# Patient Record
Sex: MALE | Race: WHITE | NOT HISPANIC OR LATINO | Employment: FULL TIME | RURAL
[De-identification: names, ages, dates, MRNs, and addresses within clinical notes are randomized per-mention and may not be internally consistent; named-entity substitution may affect disease eponyms.]

---

## 2020-06-02 ENCOUNTER — HISTORICAL (OUTPATIENT)
Dept: ADMINISTRATIVE | Facility: HOSPITAL | Age: 40
End: 2020-06-02

## 2021-07-01 ENCOUNTER — OFFICE VISIT (OUTPATIENT)
Dept: PRIMARY CARE CLINIC | Facility: CLINIC | Age: 41
End: 2021-07-01
Payer: COMMERCIAL

## 2021-07-01 VITALS
BODY MASS INDEX: 35.89 KG/M2 | RESPIRATION RATE: 18 BRPM | OXYGEN SATURATION: 97 % | TEMPERATURE: 99 F | HEART RATE: 87 BPM | SYSTOLIC BLOOD PRESSURE: 118 MMHG | HEIGHT: 72 IN | DIASTOLIC BLOOD PRESSURE: 78 MMHG | WEIGHT: 265 LBS

## 2021-07-01 DIAGNOSIS — J32.9 SINUSITIS, UNSPECIFIED CHRONICITY, UNSPECIFIED LOCATION: Primary | ICD-10-CM

## 2021-07-01 PROCEDURE — 96372 THER/PROPH/DIAG INJ SC/IM: CPT | Mod: ,,, | Performed by: FAMILY MEDICINE

## 2021-07-01 PROCEDURE — 96372 PR INJECTION,THERAP/PROPH/DIAG2ST, IM OR SUBCUT: ICD-10-PCS | Mod: ,,, | Performed by: FAMILY MEDICINE

## 2021-07-01 PROCEDURE — 99213 PR OFFICE/OUTPT VISIT, EST, LEVL III, 20-29 MIN: ICD-10-PCS | Mod: 25,,, | Performed by: FAMILY MEDICINE

## 2021-07-01 PROCEDURE — 99213 OFFICE O/P EST LOW 20 MIN: CPT | Mod: 25,,, | Performed by: FAMILY MEDICINE

## 2021-07-01 RX ORDER — TADALAFIL 5 MG/1
TABLET ORAL
COMMUNITY
Start: 2021-04-15

## 2021-07-01 RX ORDER — CEFTRIAXONE 1 G/1
1 INJECTION, POWDER, FOR SOLUTION INTRAMUSCULAR; INTRAVENOUS
Status: COMPLETED | OUTPATIENT
Start: 2021-07-01 | End: 2021-07-01

## 2021-07-01 RX ORDER — BETAMETHASONE SODIUM PHOSPHATE AND BETAMETHASONE ACETATE 3; 3 MG/ML; MG/ML
6 INJECTION, SUSPENSION INTRA-ARTICULAR; INTRALESIONAL; INTRAMUSCULAR; SOFT TISSUE
Status: COMPLETED | OUTPATIENT
Start: 2021-07-01 | End: 2021-07-01

## 2021-07-01 RX ADMIN — BETAMETHASONE SODIUM PHOSPHATE AND BETAMETHASONE ACETATE 6 MG: 3; 3 INJECTION, SUSPENSION INTRA-ARTICULAR; INTRALESIONAL; INTRAMUSCULAR; SOFT TISSUE at 03:07

## 2021-07-01 RX ADMIN — CEFTRIAXONE 1 G: 1 INJECTION, POWDER, FOR SOLUTION INTRAMUSCULAR; INTRAVENOUS at 03:07

## 2021-10-05 ENCOUNTER — OFFICE VISIT (OUTPATIENT)
Dept: PRIMARY CARE CLINIC | Facility: CLINIC | Age: 41
End: 2021-10-05
Payer: COMMERCIAL

## 2021-10-05 VITALS
WEIGHT: 255 LBS | HEIGHT: 72 IN | DIASTOLIC BLOOD PRESSURE: 72 MMHG | TEMPERATURE: 98 F | HEART RATE: 88 BPM | BODY MASS INDEX: 34.54 KG/M2 | SYSTOLIC BLOOD PRESSURE: 124 MMHG | RESPIRATION RATE: 18 BRPM | OXYGEN SATURATION: 97 %

## 2021-10-05 DIAGNOSIS — R05.9 COUGH: ICD-10-CM

## 2021-10-05 DIAGNOSIS — H92.02 LEFT EAR PAIN: Primary | ICD-10-CM

## 2021-10-05 DIAGNOSIS — R51.9 NONINTRACTABLE HEADACHE, UNSPECIFIED CHRONICITY PATTERN, UNSPECIFIED HEADACHE TYPE: ICD-10-CM

## 2021-10-05 DIAGNOSIS — R52 BODY ACHES: ICD-10-CM

## 2021-10-05 DIAGNOSIS — R09.89 RUNNY NOSE: ICD-10-CM

## 2021-10-05 DIAGNOSIS — J02.9 SORE THROAT: ICD-10-CM

## 2021-10-05 DIAGNOSIS — J01.10 ACUTE FRONTAL SINUSITIS, RECURRENCE NOT SPECIFIED: ICD-10-CM

## 2021-10-05 DIAGNOSIS — R09.81 NASAL CONGESTION: ICD-10-CM

## 2021-10-05 DIAGNOSIS — R19.7 DIARRHEA, UNSPECIFIED TYPE: ICD-10-CM

## 2021-10-05 PROCEDURE — 99213 OFFICE O/P EST LOW 20 MIN: CPT | Mod: 25,,, | Performed by: NURSE PRACTITIONER

## 2021-10-05 PROCEDURE — 96372 THER/PROPH/DIAG INJ SC/IM: CPT | Mod: ,,, | Performed by: NURSE PRACTITIONER

## 2021-10-05 PROCEDURE — 99213 PR OFFICE/OUTPT VISIT, EST, LEVL III, 20-29 MIN: ICD-10-PCS | Mod: 25,,, | Performed by: NURSE PRACTITIONER

## 2021-10-05 PROCEDURE — 96372 PR INJECTION,THERAP/PROPH/DIAG2ST, IM OR SUBCUT: ICD-10-PCS | Mod: ,,, | Performed by: NURSE PRACTITIONER

## 2021-10-05 RX ORDER — BETAMETHASONE SODIUM PHOSPHATE AND BETAMETHASONE ACETATE 3; 3 MG/ML; MG/ML
6 INJECTION, SUSPENSION INTRA-ARTICULAR; INTRALESIONAL; INTRAMUSCULAR; SOFT TISSUE
Status: COMPLETED | OUTPATIENT
Start: 2021-10-05 | End: 2021-10-05

## 2021-10-05 RX ORDER — AMOXICILLIN AND CLAVULANATE POTASSIUM 500; 125 MG/1; MG/1
1 TABLET, FILM COATED ORAL EVERY 12 HOURS
Qty: 20 TABLET | Refills: 0 | Status: SHIPPED | OUTPATIENT
Start: 2021-10-05 | End: 2021-10-05 | Stop reason: CLARIF

## 2021-10-05 RX ORDER — CEFTRIAXONE 1 G/1
1 INJECTION, POWDER, FOR SOLUTION INTRAMUSCULAR; INTRAVENOUS
Status: COMPLETED | OUTPATIENT
Start: 2021-10-05 | End: 2021-10-05

## 2021-10-05 RX ADMIN — BETAMETHASONE SODIUM PHOSPHATE AND BETAMETHASONE ACETATE 6 MG: 3; 3 INJECTION, SUSPENSION INTRA-ARTICULAR; INTRALESIONAL; INTRAMUSCULAR; SOFT TISSUE at 05:10

## 2021-10-05 RX ADMIN — CEFTRIAXONE 1 G: 1 INJECTION, POWDER, FOR SOLUTION INTRAMUSCULAR; INTRAVENOUS at 05:10

## 2021-10-18 ENCOUNTER — OFFICE VISIT (OUTPATIENT)
Dept: PRIMARY CARE CLINIC | Facility: CLINIC | Age: 41
End: 2021-10-18
Payer: COMMERCIAL

## 2021-10-18 VITALS
HEART RATE: 85 BPM | HEIGHT: 72 IN | TEMPERATURE: 98 F | DIASTOLIC BLOOD PRESSURE: 84 MMHG | RESPIRATION RATE: 20 BRPM | OXYGEN SATURATION: 97 % | WEIGHT: 269 LBS | SYSTOLIC BLOOD PRESSURE: 122 MMHG | BODY MASS INDEX: 36.44 KG/M2

## 2021-10-18 DIAGNOSIS — J32.9 SINUSITIS, UNSPECIFIED CHRONICITY, UNSPECIFIED LOCATION: Primary | ICD-10-CM

## 2021-10-18 DIAGNOSIS — R53.83 FATIGUE, UNSPECIFIED TYPE: ICD-10-CM

## 2021-10-18 PROCEDURE — 99213 OFFICE O/P EST LOW 20 MIN: CPT | Mod: 25,,, | Performed by: FAMILY MEDICINE

## 2021-10-18 PROCEDURE — 96372 THER/PROPH/DIAG INJ SC/IM: CPT | Mod: ,,, | Performed by: FAMILY MEDICINE

## 2021-10-18 PROCEDURE — 99213 PR OFFICE/OUTPT VISIT, EST, LEVL III, 20-29 MIN: ICD-10-PCS | Mod: 25,,, | Performed by: FAMILY MEDICINE

## 2021-10-18 PROCEDURE — 96372 PR INJECTION,THERAP/PROPH/DIAG2ST, IM OR SUBCUT: ICD-10-PCS | Mod: ,,, | Performed by: FAMILY MEDICINE

## 2021-10-18 RX ORDER — AMOXICILLIN 500 MG/1
CAPSULE ORAL
COMMUNITY
Start: 2021-10-06 | End: 2022-10-13 | Stop reason: ALTCHOICE

## 2021-10-18 RX ORDER — PHENTERMINE HYDROCHLORIDE 37.5 MG/1
37.5 TABLET ORAL
Qty: 30 TABLET | Refills: 0 | Status: SHIPPED | OUTPATIENT
Start: 2021-10-18 | End: 2021-11-17

## 2021-10-18 RX ORDER — CEFTRIAXONE 1 G/1
1 INJECTION, POWDER, FOR SOLUTION INTRAMUSCULAR; INTRAVENOUS
Status: COMPLETED | OUTPATIENT
Start: 2021-10-18 | End: 2021-10-18

## 2021-10-18 RX ORDER — NEOMYCIN SULFATE, POLYMYXIN B SULFATE AND HYDROCORTISONE 10; 3.5; 1 MG/ML; MG/ML; [USP'U]/ML
SUSPENSION/ DROPS AURICULAR (OTIC)
COMMUNITY
Start: 2021-10-06 | End: 2022-10-13 | Stop reason: ALTCHOICE

## 2021-10-18 RX ORDER — BETAMETHASONE SODIUM PHOSPHATE AND BETAMETHASONE ACETATE 3; 3 MG/ML; MG/ML
6 INJECTION, SUSPENSION INTRA-ARTICULAR; INTRALESIONAL; INTRAMUSCULAR; SOFT TISSUE
Status: COMPLETED | OUTPATIENT
Start: 2021-10-18 | End: 2021-10-18

## 2021-10-18 RX ADMIN — CEFTRIAXONE 1 G: 1 INJECTION, POWDER, FOR SOLUTION INTRAMUSCULAR; INTRAVENOUS at 03:10

## 2021-10-18 RX ADMIN — BETAMETHASONE SODIUM PHOSPHATE AND BETAMETHASONE ACETATE 6 MG: 3; 3 INJECTION, SUSPENSION INTRA-ARTICULAR; INTRALESIONAL; INTRAMUSCULAR; SOFT TISSUE at 03:10

## 2021-12-08 ENCOUNTER — OFFICE VISIT (OUTPATIENT)
Dept: PRIMARY CARE CLINIC | Facility: CLINIC | Age: 41
End: 2021-12-08
Payer: COMMERCIAL

## 2021-12-08 VITALS
HEIGHT: 72 IN | DIASTOLIC BLOOD PRESSURE: 84 MMHG | TEMPERATURE: 98 F | RESPIRATION RATE: 20 BRPM | HEART RATE: 88 BPM | SYSTOLIC BLOOD PRESSURE: 122 MMHG | BODY MASS INDEX: 35.76 KG/M2 | WEIGHT: 264 LBS | OXYGEN SATURATION: 97 %

## 2021-12-08 DIAGNOSIS — E66.9 OBESITY, UNSPECIFIED CLASSIFICATION, UNSPECIFIED OBESITY TYPE, UNSPECIFIED WHETHER SERIOUS COMORBIDITY PRESENT: ICD-10-CM

## 2021-12-08 DIAGNOSIS — R05.9 COUGH: Primary | ICD-10-CM

## 2021-12-08 DIAGNOSIS — J45.40 MODERATE PERSISTENT ASTHMATIC BRONCHITIS WITHOUT COMPLICATION: ICD-10-CM

## 2021-12-08 PROCEDURE — 99213 PR OFFICE/OUTPT VISIT, EST, LEVL III, 20-29 MIN: ICD-10-PCS | Mod: 25,,, | Performed by: FAMILY MEDICINE

## 2021-12-08 PROCEDURE — 96372 THER/PROPH/DIAG INJ SC/IM: CPT | Mod: ,,, | Performed by: FAMILY MEDICINE

## 2021-12-08 PROCEDURE — 96372 PR INJECTION,THERAP/PROPH/DIAG2ST, IM OR SUBCUT: ICD-10-PCS | Mod: ,,, | Performed by: FAMILY MEDICINE

## 2021-12-08 PROCEDURE — 99213 OFFICE O/P EST LOW 20 MIN: CPT | Mod: 25,,, | Performed by: FAMILY MEDICINE

## 2021-12-08 RX ORDER — ALBUTEROL SULFATE 90 UG/1
2 AEROSOL, METERED RESPIRATORY (INHALATION) EVERY 6 HOURS PRN
Qty: 18 G | Refills: 0 | Status: SHIPPED | OUTPATIENT
Start: 2021-12-08 | End: 2022-10-13 | Stop reason: ALTCHOICE

## 2021-12-08 RX ORDER — BETAMETHASONE SODIUM PHOSPHATE AND BETAMETHASONE ACETATE 3; 3 MG/ML; MG/ML
6 INJECTION, SUSPENSION INTRA-ARTICULAR; INTRALESIONAL; INTRAMUSCULAR; SOFT TISSUE
Status: COMPLETED | OUTPATIENT
Start: 2021-12-08 | End: 2021-12-08

## 2021-12-08 RX ORDER — DEXCHLORPHENIRAMINE MALEATE, DEXTROMETHORPHAN HBR, PHENYLEPHRINE HCL 1; 10; 5 MG/5ML; MG/5ML; MG/5ML
10 SYRUP ORAL
Qty: 240 ML | Refills: 1 | Status: SHIPPED | OUTPATIENT
Start: 2021-12-08 | End: 2022-10-13 | Stop reason: ALTCHOICE

## 2021-12-08 RX ORDER — CEFTRIAXONE 1 G/1
1 INJECTION, POWDER, FOR SOLUTION INTRAMUSCULAR; INTRAVENOUS
Status: COMPLETED | OUTPATIENT
Start: 2021-12-08 | End: 2021-12-08

## 2021-12-08 RX ORDER — PHENTERMINE HYDROCHLORIDE 37.5 MG/1
37.5 TABLET ORAL
COMMUNITY
End: 2021-12-08 | Stop reason: SDUPTHER

## 2021-12-08 RX ORDER — PHENTERMINE HYDROCHLORIDE 37.5 MG/1
37.5 TABLET ORAL
Qty: 30 TABLET | Refills: 0 | Status: SHIPPED | OUTPATIENT
Start: 2021-12-08 | End: 2022-04-21 | Stop reason: SDUPTHER

## 2021-12-08 RX ORDER — AMOXICILLIN AND CLAVULANATE POTASSIUM 500; 125 MG/1; MG/1
1 TABLET, FILM COATED ORAL 2 TIMES DAILY
Qty: 20 TABLET | Refills: 0 | Status: SHIPPED | OUTPATIENT
Start: 2021-12-08 | End: 2022-10-13 | Stop reason: ALTCHOICE

## 2021-12-08 RX ADMIN — BETAMETHASONE SODIUM PHOSPHATE AND BETAMETHASONE ACETATE 6 MG: 3; 3 INJECTION, SUSPENSION INTRA-ARTICULAR; INTRALESIONAL; INTRAMUSCULAR; SOFT TISSUE at 04:12

## 2021-12-08 RX ADMIN — CEFTRIAXONE 1 G: 1 INJECTION, POWDER, FOR SOLUTION INTRAMUSCULAR; INTRAVENOUS at 04:12

## 2022-02-06 ENCOUNTER — HOSPITAL ENCOUNTER (EMERGENCY)
Facility: HOSPITAL | Age: 42
Discharge: HOME | End: 2022-02-06
Attending: FAMILY MEDICINE
Payer: COMMERCIAL

## 2022-02-06 VITALS
TEMPERATURE: 98 F | SYSTOLIC BLOOD PRESSURE: 124 MMHG | WEIGHT: 255 LBS | OXYGEN SATURATION: 98 % | HEART RATE: 100 BPM | DIASTOLIC BLOOD PRESSURE: 64 MMHG | BODY MASS INDEX: 34.54 KG/M2 | HEIGHT: 72 IN | RESPIRATION RATE: 21 BRPM

## 2022-02-06 DIAGNOSIS — G89.11 ACUTE PAIN OF RIGHT SHOULDER DUE TO TRAUMA: ICD-10-CM

## 2022-02-06 DIAGNOSIS — M25.511 ACUTE PAIN OF RIGHT SHOULDER DUE TO TRAUMA: ICD-10-CM
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
## 2022-02-06 PROCEDURE — 99283 EMERGENCY DEPT VISIT LOW MDM: CPT | Mod: ,,, | Performed by: FAMILY MEDICINE

## 2022-02-06 PROCEDURE — 99283 PR EMERGENCY DEPT VISIT,LEVEL III: ICD-10-PCS | Mod: ,,, | Performed by: FAMILY MEDICINE

## 2022-02-06 PROCEDURE — 99284 EMERGENCY DEPT VISIT MOD MDM: CPT | Mod: 25

## 2022-02-06 PROCEDURE — 96372 THER/PROPH/DIAG INJ SC/IM: CPT

## 2022-02-06 PROCEDURE — 63600175 PHARM REV CODE 636 W HCPCS: Performed by: FAMILY MEDICINE

## 2022-02-06 RX ORDER — METHOCARBAMOL 750 MG/1
TABLET, FILM COATED ORAL
Qty: 30 TABLET | Refills: 0 | Status: SHIPPED | OUTPATIENT
Start: 2022-02-06 | End: 2022-10-13 | Stop reason: ALTCHOICE

## 2022-02-06 RX ORDER — NABUMETONE 750 MG/1
750 TABLET, FILM COATED ORAL 2 TIMES DAILY
Qty: 20 TABLET | Refills: 0 | Status: SHIPPED | OUTPATIENT
Start: 2022-02-06 | End: 2022-10-13 | Stop reason: ALTCHOICE

## 2022-02-06 RX ORDER — ORPHENADRINE CITRATE 30 MG/ML
60 INJECTION INTRAMUSCULAR; INTRAVENOUS
Status: COMPLETED | OUTPATIENT
Start: 2022-02-06 | End: 2022-02-06

## 2022-02-06 RX ORDER — KETOROLAC TROMETHAMINE 30 MG/ML
60 INJECTION, SOLUTION INTRAMUSCULAR; INTRAVENOUS
Status: COMPLETED | OUTPATIENT
Start: 2022-02-06 | End: 2022-02-06

## 2022-02-06 RX ADMIN — KETOROLAC TROMETHAMINE 60 MG: 30 INJECTION, SOLUTION INTRAMUSCULAR at 08:02

## 2022-02-06 RX ADMIN — ORPHENADRINE CITRATE 60 MG: 30 INJECTION INTRAMUSCULAR; INTRAVENOUS at 08:02

## 2022-02-07 ENCOUNTER — TELEPHONE (OUTPATIENT)
Dept: EMERGENCY MEDICINE | Facility: HOSPITAL | Age: 42
End: 2022-02-07
Payer: COMMERCIAL

## 2022-02-07 NOTE — ED TRIAGE NOTES
PRESENTS WITH C/O PAIN TO RT BACK UPPER RIB PAIN AND LOWER RT SHOULDER PAIN. STATES HE WAS THROWN FROM A HORSE APPRO 1-11/2 HOUR AGO. PAIN WORSE WITH MOVEMENT AND PAIN WITH INSPIRATION.

## 2022-02-07 NOTE — ED PROVIDER NOTES
Encounter Date: 2/6/2022       History     Chief Complaint   Patient presents with    Rib Injury     RT UPPER BACK RIB PAIN    Shoulder Pain     RT SHOULDER     Pt fell from horse about 1.5 hrs ago.  C/o pain between right shoulder and back, hurting worse with certain shoulder and torso movements and worse with deep inspirations.  No head strike, no LOC.  No N/V/Dizziness.  No other injury or c/o.          Review of patient's allergies indicates:   Allergen Reactions    Bactrim [sulfamethoxazole-trimethoprim]      History reviewed. No pertinent past medical history.  History reviewed. No pertinent surgical history.  History reviewed. No pertinent family history.  Social History     Tobacco Use    Smoking status: Never Smoker    Smokeless tobacco: Never Used   Substance Use Topics    Alcohol use: Yes     Comment: socially    Drug use: Never     Review of Systems   Musculoskeletal: Positive for back pain (muscles between right shoulder and spine.).   All other systems reviewed and are negative.      Physical Exam     Initial Vitals [02/06/22 1927]   BP Pulse Resp Temp SpO2   138/85 106 (!) 21 98.3 °F (36.8 °C) 98 %      MAP       --         Physical Exam    Nursing note and vitals reviewed.  Constitutional: He appears well-developed and well-nourished.   HENT:   Head: Normocephalic and atraumatic.   Neck: Neck supple.   Normal range of motion.  Cardiovascular: Normal rate, regular rhythm and normal heart sounds.   Pulmonary/Chest: Breath sounds normal. No respiratory distress. He has no wheezes. He has no rhonchi. He has no rales.   Musculoskeletal:         General: No tenderness or edema. Normal range of motion.      Cervical back: Normal range of motion and neck supple.     Neurological: He is alert and oriented to person, place, and time. He has normal strength. He displays normal reflexes. No cranial nerve deficit or sensory deficit.   Skin: Skin is warm and dry. Capillary refill takes less than 2 seconds.    Psychiatric: He has a normal mood and affect.         Medical Screening Exam   See Full Note    ED Course   Procedures  Labs Reviewed - No data to display       Imaging Results          X-Ray Shoulder Trauma Right (In process)                X-Ray Chest PA And Lateral (In process)                  Medications   ketorolac injection 60 mg (60 mg Intramuscular Given 2/6/22 2003)   orphenadrine injection 60 mg (60 mg Intramuscular Given 2/6/22 2003)                       Clinical Impression:   Final diagnoses:  [W18.39XA, Y93.79] Fall during sporting event, initial encounter  [M25.511, G89.11] Acute pain of right shoulder due to trauma  [V80.010A] Fall from horse, initial encounter (Primary)          ED Disposition Condition    Discharge Stable        ED Prescriptions     Medication Sig Dispense Start Date End Date Auth. Provider    nabumetone (RELAFEN) 750 MG tablet Take 1 tablet (750 mg total) by mouth 2 (two) times daily. 20 tablet 2/6/2022  Lars Ramos MD    methocarbamoL (ROBAXIN) 750 MG Tab Take 1-2 tabs po q6h prn pain/spasm.  Drowsy precautions. 30 tablet 2/6/2022  Lars Ramos MD        Follow-up Information     Follow up With Specialties Details Why Contact Info    Herminia Murphy MD Family Medicine   [address] [facility] [place] [address]  [phone]             Lars Ramos MD  02/06/22 2004

## 2022-04-21 ENCOUNTER — CLINICAL SUPPORT (OUTPATIENT)
Dept: PRIMARY CARE CLINIC | Facility: CLINIC | Age: 42
End: 2022-04-21
Payer: COMMERCIAL

## 2022-04-21 VITALS
BODY MASS INDEX: 36.57 KG/M2 | RESPIRATION RATE: 20 BRPM | WEIGHT: 270 LBS | DIASTOLIC BLOOD PRESSURE: 72 MMHG | SYSTOLIC BLOOD PRESSURE: 114 MMHG | HEIGHT: 72 IN

## 2022-04-21 DIAGNOSIS — E66.9 OBESITY, UNSPECIFIED CLASSIFICATION, UNSPECIFIED OBESITY TYPE, UNSPECIFIED WHETHER SERIOUS COMORBIDITY PRESENT: ICD-10-CM

## 2022-04-21 RX ORDER — PHENTERMINE HYDROCHLORIDE 37.5 MG/1
37.5 TABLET ORAL
Qty: 30 TABLET | Refills: 0 | Status: SHIPPED | OUTPATIENT
Start: 2022-04-21 | End: 2022-10-03 | Stop reason: SDUPTHER

## 2022-10-03 DIAGNOSIS — E66.9 OBESITY, UNSPECIFIED CLASSIFICATION, UNSPECIFIED OBESITY TYPE, UNSPECIFIED WHETHER SERIOUS COMORBIDITY PRESENT: ICD-10-CM

## 2022-10-04 RX ORDER — PHENTERMINE HYDROCHLORIDE 37.5 MG/1
37.5 TABLET ORAL
Qty: 30 TABLET | Refills: 0 | Status: SHIPPED | OUTPATIENT
Start: 2022-10-04

## 2022-10-06 DIAGNOSIS — M67.912 TENDINOPATHY OF LEFT SHOULDER: Primary | ICD-10-CM

## 2022-10-13 ENCOUNTER — OFFICE VISIT (OUTPATIENT)
Dept: PRIMARY CARE CLINIC | Facility: CLINIC | Age: 42
End: 2022-10-13
Payer: COMMERCIAL

## 2022-10-13 VITALS
OXYGEN SATURATION: 97 % | HEIGHT: 72 IN | SYSTOLIC BLOOD PRESSURE: 120 MMHG | DIASTOLIC BLOOD PRESSURE: 72 MMHG | HEART RATE: 94 BPM | RESPIRATION RATE: 20 BRPM | BODY MASS INDEX: 36.84 KG/M2 | TEMPERATURE: 99 F | WEIGHT: 272 LBS

## 2022-10-13 DIAGNOSIS — J32.9 SINUSITIS, UNSPECIFIED CHRONICITY, UNSPECIFIED LOCATION: Primary | ICD-10-CM

## 2022-10-13 PROCEDURE — 3074F SYST BP LT 130 MM HG: CPT | Mod: CPTII,,, | Performed by: FAMILY MEDICINE

## 2022-10-13 PROCEDURE — 96372 THER/PROPH/DIAG INJ SC/IM: CPT | Mod: ,,, | Performed by: FAMILY MEDICINE

## 2022-10-13 PROCEDURE — 1159F PR MEDICATION LIST DOCUMENTED IN MEDICAL RECORD: ICD-10-PCS | Mod: CPTII,,, | Performed by: FAMILY MEDICINE

## 2022-10-13 PROCEDURE — 1160F PR REVIEW ALL MEDS BY PRESCRIBER/CLIN PHARMACIST DOCUMENTED: ICD-10-PCS | Mod: CPTII,,, | Performed by: FAMILY MEDICINE

## 2022-10-13 PROCEDURE — 3078F DIAST BP <80 MM HG: CPT | Mod: CPTII,,, | Performed by: FAMILY MEDICINE

## 2022-10-13 PROCEDURE — 1159F MED LIST DOCD IN RCRD: CPT | Mod: CPTII,,, | Performed by: FAMILY MEDICINE

## 2022-10-13 PROCEDURE — 3074F PR MOST RECENT SYSTOLIC BLOOD PRESSURE < 130 MM HG: ICD-10-PCS | Mod: CPTII,,, | Performed by: FAMILY MEDICINE

## 2022-10-13 PROCEDURE — 3078F PR MOST RECENT DIASTOLIC BLOOD PRESSURE < 80 MM HG: ICD-10-PCS | Mod: CPTII,,, | Performed by: FAMILY MEDICINE

## 2022-10-13 PROCEDURE — 99213 PR OFFICE/OUTPT VISIT, EST, LEVL III, 20-29 MIN: ICD-10-PCS | Mod: 25,,, | Performed by: FAMILY MEDICINE

## 2022-10-13 PROCEDURE — 96372 PR INJECTION,THERAP/PROPH/DIAG2ST, IM OR SUBCUT: ICD-10-PCS | Mod: ,,, | Performed by: FAMILY MEDICINE

## 2022-10-13 PROCEDURE — 99213 OFFICE O/P EST LOW 20 MIN: CPT | Mod: 25,,, | Performed by: FAMILY MEDICINE

## 2022-10-13 PROCEDURE — 1160F RVW MEDS BY RX/DR IN RCRD: CPT | Mod: CPTII,,, | Performed by: FAMILY MEDICINE

## 2022-10-13 RX ORDER — CEFTRIAXONE 1 G/1
1 INJECTION, POWDER, FOR SOLUTION INTRAMUSCULAR; INTRAVENOUS
Status: COMPLETED | OUTPATIENT
Start: 2022-10-13 | End: 2022-10-13

## 2022-10-13 RX ORDER — METHYLPREDNISOLONE 4 MG/1
TABLET ORAL
Qty: 21 EACH | Refills: 0 | Status: SHIPPED | OUTPATIENT
Start: 2022-10-13 | End: 2022-10-24 | Stop reason: SDUPTHER

## 2022-10-13 RX ORDER — DEXCHLORPHENIRAMINE MALEATE, DEXTROMETHORPHAN HBR, PHENYLEPHRINE HCL 1; 10; 5 MG/5ML; MG/5ML; MG/5ML
10 SYRUP ORAL
Qty: 240 ML | Refills: 1 | Status: SHIPPED | OUTPATIENT
Start: 2022-10-13 | End: 2022-12-21 | Stop reason: ALTCHOICE

## 2022-10-13 RX ORDER — CEFDINIR 300 MG/1
300 CAPSULE ORAL 2 TIMES DAILY
Qty: 20 CAPSULE | Refills: 0 | Status: SHIPPED | OUTPATIENT
Start: 2022-10-13 | End: 2022-10-23

## 2022-10-13 RX ORDER — BETAMETHASONE SODIUM PHOSPHATE AND BETAMETHASONE ACETATE 3; 3 MG/ML; MG/ML
6 INJECTION, SUSPENSION INTRA-ARTICULAR; INTRALESIONAL; INTRAMUSCULAR; SOFT TISSUE
Status: COMPLETED | OUTPATIENT
Start: 2022-10-13 | End: 2022-10-13

## 2022-10-13 RX ADMIN — BETAMETHASONE SODIUM PHOSPHATE AND BETAMETHASONE ACETATE 6 MG: 3; 3 INJECTION, SUSPENSION INTRA-ARTICULAR; INTRALESIONAL; INTRAMUSCULAR; SOFT TISSUE at 02:10

## 2022-10-13 RX ADMIN — CEFTRIAXONE 1 G: 1 INJECTION, POWDER, FOR SOLUTION INTRAMUSCULAR; INTRAVENOUS at 02:10

## 2022-10-13 NOTE — PROGRESS NOTES
Subjective:       Patient ID: Regino Will is a 42 y.o. male.    Chief Complaint: Nasal Congestion, Cough, Sinus Problem, and Otalgia    Cut hay. Now congested    Cough  Associated symptoms include ear pain. Pertinent negatives include no chest pain, fever, headaches, myalgias or shortness of breath. There is no history of environmental allergies.   Sinus Problem  Associated symptoms include congestion, coughing, ear pain and sinus pressure. Pertinent negatives include no headaches or shortness of breath.   Otalgia   Associated symptoms include coughing. Pertinent negatives include no diarrhea, headaches or vomiting.   Review of Systems   Constitutional:  Negative for fatigue and fever.   HENT:  Positive for nasal congestion, ear pain and sinus pressure/congestion. Negative for dental problem.    Eyes:  Negative for discharge.   Respiratory:  Positive for cough. Negative for choking, chest tightness and shortness of breath.    Cardiovascular:  Negative for chest pain and leg swelling.   Gastrointestinal:  Negative for constipation, diarrhea, nausea and vomiting.   Genitourinary:  Negative for discharge and flank pain.   Musculoskeletal:  Negative for arthralgias and myalgias.   Allergic/Immunologic: Negative for environmental allergies.   Neurological:  Negative for headaches and memory loss.   Psychiatric/Behavioral:  Negative for behavioral problems and hallucinations.        Objective:      Physical Exam  Vitals and nursing note reviewed.   Constitutional:       Appearance: Normal appearance. He is normal weight.   HENT:      Head: Normocephalic and atraumatic.      Right Ear: Tympanic membrane normal.      Left Ear: Tympanic membrane normal.      Ears:      Comments: Both TM's are gray but dull     Nose: Congestion present.      Mouth/Throat:      Mouth: Mucous membranes are moist.   Eyes:      Extraocular Movements: Extraocular movements intact.      Conjunctiva/sclera: Conjunctivae normal.      Pupils:  Pupils are equal, round, and reactive to light.   Cardiovascular:      Rate and Rhythm: Normal rate and regular rhythm.      Pulses: Normal pulses.   Pulmonary:      Effort: Pulmonary effort is normal.      Breath sounds: Normal breath sounds.   Abdominal:      General: Abdomen is flat. Bowel sounds are normal.      Palpations: Abdomen is soft.   Musculoskeletal:         General: Normal range of motion.      Cervical back: Normal range of motion and neck supple.   Skin:     General: Skin is warm and dry.   Neurological:      General: No focal deficit present.      Mental Status: He is alert and oriented to person, place, and time.   Psychiatric:         Mood and Affect: Mood normal.       Assessment:       Problem List Items Addressed This Visit          ENT    Sinusitis - Primary    Relevant Medications    cefTRIAXone injection 1 g (Start on 10/13/2022  2:15 PM)    betamethasone acetate-betamethasone sodium phosphate injection 6 mg (Start on 10/13/2022  2:15 PM)    cefdinir (OMNICEF) 300 MG capsule    methylPREDNISolone (MEDROL DOSEPACK) 4 mg tablet    dexchlorphen-phenylephrine-DM (POLYTUSSIN DM) 1-5-10 mg/5 mL Syrp       Plan:       RTC if s/sx continue

## 2022-10-17 ENCOUNTER — CLINICAL SUPPORT (OUTPATIENT)
Dept: REHABILITATION | Facility: HOSPITAL | Age: 42
End: 2022-10-17
Payer: COMMERCIAL

## 2022-10-17 DIAGNOSIS — R52 PAIN: ICD-10-CM

## 2022-10-17 DIAGNOSIS — M67.912 TENDINOPATHY OF LEFT SHOULDER: ICD-10-CM

## 2022-10-17 PROCEDURE — 97140 MANUAL THERAPY 1/> REGIONS: CPT

## 2022-10-17 PROCEDURE — 97161 PT EVAL LOW COMPLEX 20 MIN: CPT

## 2022-10-17 PROCEDURE — 97110 THERAPEUTIC EXERCISES: CPT

## 2022-10-17 PROCEDURE — 97035 APP MDLTY 1+ULTRASOUND EA 15: CPT

## 2022-10-17 NOTE — PLAN OF CARE
"[facility]   Physical Therapy Initial Evaluation    Name: Regino Will  Clinic Number: [id number]    Therapy Diagnosis:   Encounter Diagnosis   Name Primary?    Tendinopathy of left shoulder      Physician: Alex Roberts MD    Physician Orders: PT Eval and Treat   Medical Diagnosis from Referral: L shoulder tendinopathy  Evaluation Date: 10/17/2022  Authorization Period Expiration: 10/6/2023  Plan of Care Expiration: 11/14/2022  Visit # / Visits authorized: 1/ 8    Time In: 14:32  Time Out: 15:30  Total Appointment Time (timed & untimed codes): 58 minutes    Precautions: Standard    Subjective   Date of onset: years ago  History of current condition - Regino reports: Pt with complaints of L shoulder pain that started years ago. Pt is R handed and is  with overhead work. Pt saw ortho MD that stated irritability of tendon. Pt denies neurological symptoms and neck pathologies. Pt with increased pain with lateral shoulder abduction and ER. 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 Pt referred for conservative care.      Medical History:   No past medical history on file.    Surgical History:   Regino Will  has no past surgical history on file.    Medications:   Regino has a current medication list which includes the following prescription(s): cefdinir, polytussin dm, methylprednisolone, phentermine, and tadalafil.    Allergies:   Review of patient's allergies indicates:   Allergen Reactions    Bactrim [sulfamethoxazole-trimethoprim]         Imaging, xray: no pathologies noted    Prior Therapy: none  Social History:  lives with their family  Occupation:   Prior Level of Function: active  Current Level of Function: active    Pain:  Current 2/10, worst 5/10, best 0/10   Location: left shoulder  Description: Aching and Sharp  Aggravating Factors: yawning, reaching behind head  Easing Factors: rest    Pts goals: "to be able to reach back"     Objective     Posture: rounded " shoulders yes, forward head yes, increased kyphotic curve yes, decreased lordotic curve no  Clear cervical spine: Spurling's test negative    Range of motion  Motion Right  Left    Shoulder flexion WITHIN FUNCTIONAL LIMITS WITHIN FUNCTIONAL LIMITS   Shoulder extension WITHIN FUNCTIONAL LIMITS WITHIN FUNCTIONAL LIMITS   Shoulder abduction WITHIN FUNCTIONAL LIMITS WITHIN FUNCTIONAL LIMITS   Shoulder internal rotation WITHIN FUNCTIONAL LIMITS WITHIN FUNCTIONAL LIMITS   Shoulder external rotation WITHIN FUNCTIONAL LIMITS WITHIN FUNCTIONAL LIMITS   Elbow flexion WITHIN FUNCTIONAL LIMITS WITHIN FUNCTIONAL LIMITS   Elbow extension WITHIN FUNCTIONAL LIMITS WITHIN FUNCTIONAL LIMITS   Wrist flexion WITHIN FUNCTIONAL LIMITS WITHIN FUNCTIONAL LIMITS   Wrist extension WITHIN FUNCTIONAL LIMITS WITHIN FUNCTIONAL LIMITS   Ulnar deviation WITHIN FUNCTIONAL LIMITS WITHIN FUNCTIONAL LIMITS   Radial deviation WITHIN FUNCTIONAL LIMITS WITHIN FUNCTIONAL LIMITS     Passive range of motion: WFL's    MANUAL MUSCLE TEST  Muscle Right  Left    Shoulder flexion MMT strength: 5/5 MMT strength: 5/5   Shoulder extension MMT strength: 5/5 MMT strength: 5/5   Shoulder abduction MMT strength: 5/5 MMT strength: 5/5   Shoulder internal rotation MMT strength: 5/5 MMT strength: 5/5   Shoulder external rotation MMT strength: 5/5 MMT strength: 4-/5   Elbow flexion MMT strength: 5/5 MMT strength: 5/5   Elbow extension MMT strength: 5/5 MMT strength: 5/5   Wrist flexion MMT strength: 5/5 MMT strength: 5/5   Wrist extension MMT strength: 5/5 MMT strength: 5/5     Functional ability:  Dressing: AMB PT LEVEL OF ASSISTANCE: independent  Driving: AMB PT LEVEL OF ASSISTANCE: independent  Overhead activity: AMB PT LEVEL OF ASSISTANCE: independent but with pain  Work/hobbies: AMB PT LEVEL OF ASSISTANCE: independent but with pain      Clinical test:  Apprehension test: negative  Neer's test: positive  Scour test: negative  O'aram's test: negative  Drop arm test:  "negative  Empty can test: negative  Hawkin's luiz test: negative      Palpation: pain within teres minor and major      TREATMENT   Treatment Time In: 14:48  Treatment Time Out: 15:30  Total Treatment time (time-based codes) separate from Evaluation: 42 minutes    Regino received the treatments listed below:  THERAPEUTIC EXERCISES to develop strength, endurance, ROM, flexibility, posture, and core stabilization for 15 minutes including see flowsheet below      Ther-Ex Reps       Scap retractions 20 x 3"   Tband rows 20 x 3" blue tband   Tband shoulder extension 20 x 3" blue tband   Field goals 20 x yellow tband    Wall angels  20 x    Doorway stretch 3 x 30"    Lat stretch 3 x 30" each                                   MANUAL THERAPY TECHNIQUES including Myofacial release and Soft tissue Mobilization were applied to L lats, pecs, teres minor/major for 19 minutes.    DIRECT CONTACT MODALITIES after being cleared for contraindications: combination of Ultrasound and estim:  Regino received combo of estim with ultrasound to manage pain and inflammation  applied to the L teres minor/major at an intensity of  3.3 W/cm2  for a duration of 8 minutes. Patient tolerated treatment well without adverse effects. Therapist was in attendance throughout intervention..    Home Exercises and Patient Education Provided    Education provided:   - eval findings, POC, HEP     Written Home Exercises Provided: no not at this visit. Will provide at next visit    Assessment   Regino is a 42 y.o. male referred to outpatient Physical Therapy with a medical diagnosis of L shoulder tendinopathy. Pt presents with increased pain at certain ROM, decreased scapular strength, and increased muscle tightness.     Pt prognosis is Good.   Pt will benefit from skilled outpatient Physical Therapy to address the deficits stated above and in the chart below, provide pt/family education, and to maximize pt's level of independence.     Plan of care " discussed with patient: Yes  Pt's spiritual, cultural and educational needs considered and patient is agreeable to the plan of care and goals as stated below:     Anticipated Barriers for therapy: chronic pain, poor posture    Goals:  Pt will increase L shoulder flexion to 120 degrees, external rotation to C4, and internal rotation to L2 for independent dressing  Pt will increase L upper extremity to 4+/5 to allow patient to perform activities of daily living independently  Pt will report decrease in pain to 2/10 to improve quality of life  Pt will place 5 pound object in overhead shelf without hike and with less than or equal to 3/10 pain to allow patient to return to prior level of function      Plan   Plan of care Certification: 10/17/2022 to 11/14/2022.    Outpatient Physical Therapy 2 times weekly for 4 weeks to include the following interventions: Electrical Stimulation IFC/Biphasic, Gait Training, Manual Therapy, Moist Heat/ Ice, Neuromuscular Re-ed, Patient Education, Self Care, Therapeutic Activities, Therapeutic Exercise, Ultrasound, and dry needling .     Kyra Guillaume, PT, DPT 10/17/2022      Physician Signature: _______________________________________________________ Date: ______________

## 2022-10-19 ENCOUNTER — CLINICAL SUPPORT (OUTPATIENT)
Dept: REHABILITATION | Facility: HOSPITAL | Age: 42
End: 2022-10-19
Payer: COMMERCIAL

## 2022-10-19 DIAGNOSIS — R52 PAIN: Primary | ICD-10-CM

## 2022-10-19 PROCEDURE — 97014 ELECTRIC STIMULATION THERAPY: CPT | Mod: CQ

## 2022-10-19 PROCEDURE — 97110 THERAPEUTIC EXERCISES: CPT | Mod: CQ

## 2022-10-19 PROCEDURE — 97530 THERAPEUTIC ACTIVITIES: CPT | Mod: CQ

## 2022-10-19 NOTE — PROGRESS NOTES
" [facility]   Physical Therapy Treatment Note     Name: Regino Will  Clinic Number: [id number]    Therapy Diagnosis:   Encounter Diagnosis   Name Primary?    Pain Yes     Physician: Alex Roberts MD    Visit Date: 10/19/2022       Physician Orders: PT Eval and Treat   Medical Diagnosis from Referral: L shoulder tendinopathy  Evaluation Date: 10/17/2022  Authorization Period Expiration: 10/6/2023  Plan of Care Expiration: 11/14/2022  Visit # / Visits authorized: 1/ 8     Time In: 14:32  Time Out: 15:30  Total Appointment Time (timed & untimed codes): 58 minutes     Precautions: Standard    PTA Visit #: 1/5       SUBJECTIVE     Pt reports: My shoulder is doing alright.  He was compliant with home exercise program.  Response to previous treatment:   Functional change:     Pain: 2/10  Location: left shoulder      OBJECTIVE     Objective Measures updated at progress report unless specified.     Treatment     TherEx Reps   Pulleys 10 minutes (Flexion and Abduction)   Finger Ladder 5 x 5"   Scapular Retractions 30 x 3"   Ball Rolls 30 x 5"   Upper Trap Stretch 3 x 30"   Levator Scap Stretch 3 x 30"   Cane Flexion Stretch  10 x 10"   Cane ER Stretch  10 x 5"   Sleeper Stretch  10 x 5"   Wall Clocks Not Today    Spider Letty Not Today    TherAct    I,Y,T's Not Today   Prone Rows 2 x 10   Prone Abduction  2 x 10   Prone Extension 2 x 10         Patient Education and Home Exercises     Home Exercises Provided and Patient Education Provided     Education provided:   -POC, HEP    Written Home Exercises Provided: Patient instructed to cont prior HEP. Exercises were reviewed and Regino was able to demonstrate them prior to the end of the session.  Regino demonstrated good  understanding of the education provided. See EMR under Patient Instructions for exercises provided during therapy sessions    ASSESSMENT     Regino is a 42 y.o. male referred to outpatient Physical Therapy with a medical " diagnosis of L shoulder tendinopathy. Pt presents with increased pain at certain ROM, decreased scapular strength, and increased muscle tightness. Pt required mod cueing for proper hold times, count, muscle activation, and posture. Pt displayed increased neck stiffness during upper trap and levator scap stretch. Pt expresses that his shoulder felt much more relaxed following use of the pulleys. No adverse effects noted.    SUPERVISED MODALITIES Interferential electrical stimulation to left shoulder with CP applied for 10 minutes.     Pt prognosis is Good.   Pt will benefit from skilled outpatient Physical Therapy to address the deficits stated above and in the chart below, provide pt/family education, and to maximize pt's level of independence.      Plan of care discussed with patient: Yes  Pt's spiritual, cultural and educational needs considered and patient is agreeable to the plan of care and goals as stated below:      Anticipated Barriers for therapy: chronic pain, poor posture     Goals:  Pt will increase L shoulder flexion to 120 degrees, external rotation to C4, and internal rotation to L2 for independent dressing  Pt will increase L upper extremity to 4+/5 to allow patient to perform activities of daily living independently  Pt will report decrease in pain to 2/10 to improve quality of life  Pt will place 5 pound object in overhead shelf without hike and with less than or equal to 3/10 pain to allow patient to return to prior level of function      PLAN   Continue POC per PT orders to progress patient toward rehab goals.       NACHO Garcia

## 2022-10-24 ENCOUNTER — CLINICAL SUPPORT (OUTPATIENT)
Dept: REHABILITATION | Facility: HOSPITAL | Age: 42
End: 2022-10-24
Payer: COMMERCIAL

## 2022-10-24 DIAGNOSIS — R52 PAIN: Primary | ICD-10-CM

## 2022-10-24 DIAGNOSIS — J32.9 SINUSITIS, UNSPECIFIED CHRONICITY, UNSPECIFIED LOCATION: ICD-10-CM

## 2022-10-24 PROCEDURE — 97110 THERAPEUTIC EXERCISES: CPT | Mod: CQ

## 2022-10-24 PROCEDURE — 97014 ELECTRIC STIMULATION THERAPY: CPT | Mod: CQ

## 2022-10-24 PROCEDURE — 97140 MANUAL THERAPY 1/> REGIONS: CPT | Mod: CQ

## 2022-10-24 RX ORDER — METHYLPREDNISOLONE 4 MG/1
TABLET ORAL
Qty: 21 EACH | Refills: 0 | Status: SHIPPED | OUTPATIENT
Start: 2022-10-24 | End: 2022-11-14

## 2022-10-24 RX ORDER — CETIRIZINE HYDROCHLORIDE 10 MG/1
10 TABLET ORAL DAILY
COMMUNITY
End: 2022-10-24 | Stop reason: SDUPTHER

## 2022-10-24 RX ORDER — CETIRIZINE HYDROCHLORIDE 10 MG/1
10 TABLET ORAL DAILY
Qty: 90 TABLET | Refills: 3 | Status: SHIPPED | OUTPATIENT
Start: 2022-10-24

## 2022-10-24 RX ORDER — MONTELUKAST SODIUM 10 MG/1
10 TABLET ORAL NIGHTLY
Qty: 90 TABLET | Refills: 3 | Status: SHIPPED | OUTPATIENT
Start: 2022-10-24

## 2022-10-24 RX ORDER — MONTELUKAST SODIUM 10 MG/1
10 TABLET ORAL NIGHTLY
COMMUNITY
End: 2022-10-24 | Stop reason: SDUPTHER

## 2022-10-24 NOTE — PROGRESS NOTES
"  [facility]   Physical Therapy Treatment Note     Name: Regino Will  Clinic Number: [id number]    Therapy Diagnosis:   No diagnosis found.    Physician: Alex Roberts MD    Visit Date: 10/24/2022       Physician Orders: PT Eval and Treat   Medical Diagnosis from Referral: L shoulder tendinopathy  Evaluation Date: 10/17/2022  Authorization Period Expiration: 10/6/2023  Plan of Care Expiration: 11/14/2022  Visit # / Visits authorized: 3/8     Time In: 15:24  Time Out: 16:40  Total Appointment Time (timed & untimed codes): 76 minutes      Precautions: Standard    PTA Visit #: 1/5       SUBJECTIVE     Pt reports:  "It's still popping if I try to reach overhead and back".     He was compliant with home exercise program.  Response to previous treatment:   Functional change:     Pain: 2/10  Location: left shoulder, popping Left shoulder, superior-posterior     OBJECTIVE     Objective Measures updated at progress report unless specified.     Treatment       Ther Ex to improve strength, ROM, and flexibility.  See flow-sheet below:  Added scapula retractions with Thera Band, Shoulder Extension with Thera Band, Ball rolls up wall, Serratus Ball on Wall, ER walk outs with Thera Band, IR walk outs with Thera Band, Wall push-up, Serratus Reach supine, and Dynamic Stabs supine with flexi bar.      TherEx Reps   Pulleys 5 minutes each (Flexion and Abduction)   Finger Ladder 5 x 5"   Scapula Retractions with Thera Band  2 x 10, Green *    Shoulder extension with Thera Band  2 x 10, Green *    Ball Rolls up Wall  10 x 10" *    Upper Trap Stretch 3 x 30"   Levator Scap Stretch 3 x 30"   Cane Flexion Stretch  10 x 10"   Cane ER Stretch  10 x 5"   Sleeper Stretch  10 x 5"   Wall Clocks Not Today    Spider Letty Not Today    Serratus Ball on Wall  20 x each *    ER walk outs with Thera Band  10 x red *    IR walk outs with Thera Band  10 x red *    Wall Push UP (Serratus )  10 x *    Doorway Stretch  3 x " "20" *    Dynamic Stability Supine  2 x 10 yellow Thera Bar *    Serratus Reach  2 x 10, supine 2 # *I          Therapeutic Activities to improve functional and dynamic stability.  See flow-sheet below:  Added resistance to prone Ther Act's.     I,Y,T's Not Today   Prone Rows 2 x 10, 2 # *    Prone Abduction  2 x 10, 2 # *    Prone Extension 2 x 10, 2# *       Biphasic E-stim to Left Upper Trap and Rhomboid, and Left Lat and Subscapularis x 12 mins with MHP application to increase blood flow and decrease soft tissue tightness prior to manual therapy.     MANUAL THERAPY TECHNIQUES including Myofacial release and Soft tissue Mobilization were applied to L lats, Upper Trap, Infraspinatus, Subscapularis, Rhomboid and Middle trap followed by scap mobs and Grade 1 joint mobs to Left GH joint.     Patient Education and Home Exercises     Home Exercises Provided and Patient Education Provided     Education provided:   -POC, HEP, DOMS     Written Home Exercises Provided: Patient instructed to cont prior HEP. Exercises were reviewed and Regino was able to demonstrate them prior to the end of the session.  Regino demonstrated good  understanding of the education provided. See EMR under Patient Instructions for exercises provided during therapy sessions    ASSESSMENT     Regino is a 42 y.o. male referred to outpatient Physical Therapy with a medical diagnosis of L shoulder tendinopathy. Pt presents with increased pain at certain ROM, decreased scapular strength, and increased muscle tightness.  Ther Ex added to promote Left shoulder and scapula stability.   Pt required mod cueing for proper hold times, count, muscle activation, and posture while completing added Ther Ex. Patient report increased discomfort during manual therapy techniques, but without reports of increased pain at end of treatment session.       SUPERVISED MODALITIES Interferential electrical stimulation to left shoulder with CP applied for 10 minutes.     Pt " prognosis is Good.   Pt will benefit from skilled outpatient Physical Therapy to address the deficits stated above and in the chart below, provide pt/family education, and to maximize pt's level of independence.      Plan of care discussed with patient: Yes  Pt's spiritual, cultural and educational needs considered and patient is agreeable to the plan of care and goals as stated below:      Anticipated Barriers for therapy: chronic pain, poor posture     Goals:  Pt will increase L shoulder flexion to 120 degrees, external rotation to C4, and internal rotation to L2 for independent dressing  Pt will increase L upper extremity to 4+/5 to allow patient to perform activities of daily living independently  Pt will report decrease in pain to 2/10 to improve quality of life  Pt will place 5 pound object in overhead shelf without hike and with less than or equal to 3/10 pain to allow patient to return to prior level of function      PLAN     Plan of care Certification: 10/17/2022 to 11/14/2022.     Outpatient Physical Therapy 2 times weekly for 4 weeks to include the following interventions: Electrical Stimulation IFC/Biphasic, Gait Training, Manual Therapy, Moist Heat/ Ice, Neuromuscular Re-ed, Patient Education, Self Care, Therapeutic Activities, Therapeutic Exercise, Ultrasound, and dry needling .       Continue POC per PT orders to progress patient toward rehab goals.  NACHO Kang 10/24/2022

## 2022-10-25 ENCOUNTER — CLINICAL SUPPORT (OUTPATIENT)
Dept: PRIMARY CARE CLINIC | Facility: CLINIC | Age: 42
End: 2022-10-25
Payer: COMMERCIAL

## 2022-10-25 DIAGNOSIS — J32.9 SINUSITIS, UNSPECIFIED CHRONICITY, UNSPECIFIED LOCATION: Primary | ICD-10-CM

## 2022-10-25 PROCEDURE — 96372 THER/PROPH/DIAG INJ SC/IM: CPT | Mod: ,,, | Performed by: FAMILY MEDICINE

## 2022-10-25 PROCEDURE — 96372 PR INJECTION,THERAP/PROPH/DIAG2ST, IM OR SUBCUT: ICD-10-PCS | Mod: ,,, | Performed by: FAMILY MEDICINE

## 2022-10-25 RX ORDER — LINCOMYCIN HYDROCHLORIDE 300 MG/ML
600 INJECTION, SOLUTION INTRAMUSCULAR; INTRAVENOUS; SUBCONJUNCTIVAL
Status: COMPLETED | OUTPATIENT
Start: 2022-10-25 | End: 2022-10-25

## 2022-10-25 RX ORDER — BETAMETHASONE SODIUM PHOSPHATE AND BETAMETHASONE ACETATE 3; 3 MG/ML; MG/ML
6 INJECTION, SUSPENSION INTRA-ARTICULAR; INTRALESIONAL; INTRAMUSCULAR; SOFT TISSUE
Status: COMPLETED | OUTPATIENT
Start: 2022-10-25 | End: 2022-10-25

## 2022-10-25 RX ADMIN — BETAMETHASONE SODIUM PHOSPHATE AND BETAMETHASONE ACETATE 6 MG: 3; 3 INJECTION, SUSPENSION INTRA-ARTICULAR; INTRALESIONAL; INTRAMUSCULAR; SOFT TISSUE at 04:10

## 2022-10-25 RX ADMIN — LINCOMYCIN HYDROCHLORIDE 600 MG: 300 INJECTION, SOLUTION INTRAMUSCULAR; INTRAVENOUS; SUBCONJUNCTIVAL at 04:10

## 2022-10-25 NOTE — PROGRESS NOTES
Per Dr BANDA V/O pt can have Lincocin 600mg IM and Celestone 6mg IM for recurrent Sinusitis and Otitis Media.

## 2022-10-26 ENCOUNTER — CLINICAL SUPPORT (OUTPATIENT)
Dept: REHABILITATION | Facility: HOSPITAL | Age: 42
End: 2022-10-26
Payer: COMMERCIAL

## 2022-10-26 DIAGNOSIS — R52 PAIN: Primary | ICD-10-CM

## 2022-10-26 PROCEDURE — 97140 MANUAL THERAPY 1/> REGIONS: CPT | Mod: CQ

## 2022-10-26 PROCEDURE — 97110 THERAPEUTIC EXERCISES: CPT | Mod: CQ

## 2022-10-26 PROCEDURE — 97014 ELECTRIC STIMULATION THERAPY: CPT | Mod: CQ

## 2022-10-26 NOTE — PROGRESS NOTES
"  [facility]   Physical Therapy Treatment Note     Name: Regino Will  Clinic Number: [id number]    Therapy Diagnosis:   Encounter Diagnosis   Name Primary?    Pain Yes       Physician: Alex Roberts MD    Visit Date: 10/26/2022       Physician Orders: PT Eval and Treat   Medical Diagnosis from Referral: L shoulder tendinopathy  Evaluation Date: 10/17/2022  Authorization Period Expiration: 10/6/2023  Plan of Care Expiration: 11/14/2022  Visit # / Visits authorized: 4/8     Time In: 15:25  Time Out: 16:45  Total Appointment Time (timed & untimed codes): 80 minutes      Precautions: Standard    PTA Visit #: 2/5      SUBJECTIVE     Pt reports:  "My shoulder (L) doesn't feel as tight, but it's still popping".     He was compliant with home exercise program.  Response to previous treatment:  No adverse effects noted   Functional change:  Improved mobility     Pain: 2/10  Location: left shoulder, popping Left shoulder, superior-posterior     OBJECTIVE     Objective Measures updated at progress report unless specified.     Treatment       Ther Ex to improve strength, ROM, and flexibility.  See flow-sheet below:      TherEx Reps   Pulleys 5 minutes each (Flexion and Abduction)   Finger Ladder 5 x 5"   Scapula Retractions with Thera Band  2 x 10, Green    Shoulder extension with Thera Band  2 x 10, Green    Ball Rolls up Wall  10 x 10" *    Upper Trap Stretch 3 x 30"   Levator Scap Stretch 3 x 30"   Cane Flexion Stretch  10 x 10"   Cane ER Stretch  10 x 5"   Sleeper Stretch  10 x 5"   Serratus Ball on Wall  20 x each    ER walk outs with Thera Band  10 x red    IR walk outs with Thera Band  10 x red    Wall Push UP (Serratus )  20 x    Doorway Stretch  3 x 20"    Dynamic Stability Supine  2 x 10 yellow Thera Bar    Serratus Reach  2 x 10, supine 2 #          Therapeutic Activities to improve functional and dynamic stability.  See flow-sheet below:  Added resistance to prone Ther Act's. "     I,Y,T's Not Today   Prone Rows 2 x 10, 2 #    Prone Abduction  2 x 10, 2 #    Prone Extension 2 x 10, 2#       Biphasic E-stim to Left Upper Trap and Rhomboid, and Left Lat and Subscapularis x 12 mins with MHP application to increase blood flow and decrease soft tissue tightness prior to manual therapy.     MANUAL THERAPY TECHNIQUES:  Grade 1 joint mobs/oscillations to Left GH joint     Patient Education and Home Exercises     Home Exercises Provided and Patient Education Provided     Education provided: Patient instructed via demonstration to add Pendulums clockwise and counter-clockwise to HEP  -POC, HEP, DOMS     Written Home Exercises Provided: Patient instructed to cont prior HEP. Exercises were reviewed and Regino was able to demonstrate them prior to the end of the session.  Regino demonstrated good  understanding of the education provided. See EMR under Patient Instructions for exercises provided during therapy sessions    ASSESSMENT     Regino is a 42 y.o. male referred to outpatient Physical Therapy with a medical diagnosis of L shoulder tendinopathy. Pt presents with increased pain at certain ROM, decreased scapular strength, and increased muscle tightness.  Patient presents with mod carryover to progress through Ther Ex and Ther Act with proper form, count, and hold times.  LPTA provided cues throughout 50% of treatment to correct form for adequate scapula stability.  Patient complains of popping in Left shoulder during most of time of Pulleys with flexion and abduction, but reports less popping when instructed to maintain Left UE in open can position.  NO adverse effects noted during treatment session.      Pt prognosis is Good.   Pt will benefit from skilled outpatient Physical Therapy to address the deficits stated above and in the chart below, provide pt/family education, and to maximize pt's level of independence.      Plan of care discussed with patient: Yes  Pt's spiritual, cultural and  educational needs considered and patient is agreeable to the plan of care and goals as stated below:      Anticipated Barriers for therapy: chronic pain, poor posture     Goals:  Pt will increase L shoulder flexion to 120 degrees, external rotation to C4, and internal rotation to L2 for independent dressing  Pt will increase L upper extremity to 4+/5 to allow patient to perform activities of daily living independently  Pt will report decrease in pain to 2/10 to improve quality of life  Pt will place 5 pound object in overhead shelf without hike and with less than or equal to 3/10 pain to allow patient to return to prior level of function      PLAN     Plan of care Certification: 10/17/2022 to 11/14/2022.     Outpatient Physical Therapy 2 times weekly for 4 weeks to include the following interventions: Electrical Stimulation IFC/Biphasic, Gait Training, Manual Therapy, Moist Heat/ Ice, Neuromuscular Re-ed, Patient Education, Self Care, Therapeutic Activities, Therapeutic Exercise, Ultrasound, and dry needling .       Continue POC per PT orders to progress patient toward rehab goals.  NACHO Kang 10/26/2022

## 2022-10-31 ENCOUNTER — CLINICAL SUPPORT (OUTPATIENT)
Dept: REHABILITATION | Facility: HOSPITAL | Age: 42
End: 2022-10-31
Payer: COMMERCIAL

## 2022-10-31 DIAGNOSIS — R52 PAIN: Primary | ICD-10-CM

## 2022-10-31 PROCEDURE — 97014 ELECTRIC STIMULATION THERAPY: CPT | Mod: CQ

## 2022-10-31 PROCEDURE — 97110 THERAPEUTIC EXERCISES: CPT | Mod: CQ

## 2022-10-31 PROCEDURE — 97530 THERAPEUTIC ACTIVITIES: CPT | Mod: CQ

## 2022-10-31 NOTE — PROGRESS NOTES
"  [facility]   Physical Therapy Treatment Note     Name: Regino Will  Clinic Number: [id number]    Therapy Diagnosis:   Encounter Diagnosis   Name Primary?    Pain Yes       Physician: Alex Roberts MD    Visit Date: 10/31/2022       Physician Orders: PT Eval and Treat   Medical Diagnosis from Referral: L shoulder tendinopathy  Evaluation Date: 10/17/2022  Authorization Period Expiration: 10/6/2023  Plan of Care Expiration: 11/14/2022  Visit # / Visits authorized: 5/8     Time In: 15:25  Time Out: 16:29  Total Appointment Time (timed & untimed codes): 64 minutes      Precautions: Standard    PTA Visit #: 3/5      SUBJECTIVE     Pt reports:  "I haven't used my shoulder much but it is still popping".     He was compliant with home exercise program.  Response to previous treatment:  No adverse effects noted   Functional change:  Improved mobility     Pain: 0/10  Location: left shoulder, popping Left shoulder, superior-posterior     OBJECTIVE     Objective Measures updated at progress report unless specified.     Treatment       Ther Ex to improve strength, ROM, and flexibility.  See flow-sheet below:      TherEx Reps   Arm Bike 5 minutes each    Finger Ladder 5 x 5"   Scapula Retractions with Thera Band  2 x 10, Green    Shoulder extension with Thera Band  2 x 10, Green    Ball Rolls up Wall  10 x 10"    Upper Trap Stretch 3 x 30"   Levator Scap Stretch 3 x 30"   Cane Flexion Stretch  10 x 10"   Cane ER Stretch  10 x 5"   Sleeper Stretch  10 x 5"   Serratus Ball on Wall  20 x each    ER walk outs with Thera Tube 10 x green   IR walk outs with Thera Tube 10 x green   Wall Push UP (Serratus )  20 x    Doorway Stretch  3 x 20"    Dynamic Stability Trampoline 2 x 10 red med ball manual perturbations by LPTA *   Serratus Reach  2 x 10, supine 2 #          Therapeutic Activities to improve functional and dynamic stability.  See flow-sheet below:  Added resistance to prone Ther Act's. "     I,Y,T's Not Today   Prone Rows 2 x 10, 2 #    Prone Abduction  2 x 10, 2 #    Prone Extension 2 x 10, 2#       Biphasic E-stim to Left Upper Trap and Rhomboid, and Left Lat and Subscapularis x 12 mins with MHP application to increase blood flow and decrease soft tissue tightness prior to manual therapy.     MANUAL THERAPY TECHNIQUES:  Grade 1 joint mobs/oscillations to Left GH joint (Not Today)    Patient Education and Home Exercises     Home Exercises Provided and Patient Education Provided     Education provided: Patient instructed via demonstration to add Pendulums clockwise and counter-clockwise to HEP  -POC, HEP, DOMS     Written Home Exercises Provided: Patient instructed to cont prior HEP. Exercises were reviewed and Regino was able to demonstrate them prior to the end of the session.  Regino demonstrated good  understanding of the education provided. See EMR under Patient Instructions for exercises provided during therapy sessions    ASSESSMENT     Regino is a 42 y.o. male referred to outpatient Physical Therapy with a medical diagnosis of L shoulder tendinopathy. Pt presents with increased pain at certain ROM, decreased scapular strength, and increased muscle tightness.  Pt reports some discomfort when performing sleeper stretches. Pt handled dynamic stability exercises well, while displaying increased strength and control. Pt presents with increased recall to progress through Ther Ex and Ther Act with proper form, count, and hold times. No adverse effects noted to tx.      Pt prognosis is Good.   Pt will benefit from skilled outpatient Physical Therapy to address the deficits stated above and in the chart below, provide pt/family education, and to maximize pt's level of independence.      Plan of care discussed with patient: Yes  Pt's spiritual, cultural and educational needs considered and patient is agreeable to the plan of care and goals as stated below:      Anticipated Barriers for therapy:  chronic pain, poor posture     Goals:  Pt will increase L shoulder flexion to 120 degrees, external rotation to C4, and internal rotation to L2 for independent dressing  Pt will increase L upper extremity to 4+/5 to allow patient to perform activities of daily living independently  Pt will report decrease in pain to 2/10 to improve quality of life  Pt will place 5 pound object in overhead shelf without hike and with less than or equal to 3/10 pain to allow patient to return to prior level of function      PLAN     Plan of care Certification: 10/17/2022 to 11/14/2022.     Outpatient Physical Therapy 2 times weekly for 4 weeks to include the following interventions: Electrical Stimulation IFC/Biphasic, Gait Training, Manual Therapy, Moist Heat/ Ice, Neuromuscular Re-ed, Patient Education, Self Care, Therapeutic Activities, Therapeutic Exercise, Ultrasound, and dry needling .       Continue POC per PT orders to progress patient toward rehab goals.  NACHO Garcia 10/31/2022

## 2022-11-07 ENCOUNTER — CLINICAL SUPPORT (OUTPATIENT)
Dept: REHABILITATION | Facility: HOSPITAL | Age: 42
End: 2022-11-07
Payer: COMMERCIAL

## 2022-11-07 DIAGNOSIS — R52 PAIN: Primary | ICD-10-CM

## 2022-11-07 PROCEDURE — 97014 ELECTRIC STIMULATION THERAPY: CPT | Mod: CQ

## 2022-11-07 PROCEDURE — 97110 THERAPEUTIC EXERCISES: CPT | Mod: CQ

## 2022-11-07 NOTE — PROGRESS NOTES
"  [facility]   Physical Therapy Treatment Note     Name: Regino Will  Clinic Number: [id number]    Therapy Diagnosis:   Encounter Diagnosis   Name Primary?    Pain Yes       Physician: Alex Roberts MD    Visit Date: 11/7/2022       Physician Orders: PT Eval and Treat   Medical Diagnosis from Referral: L shoulder tendinopathy  Evaluation Date: 10/17/2022  Authorization Period Expiration: 10/6/2023  Plan of Care Expiration: 11/14/2022  Visit # / Visits authorized: 6/8     Time In: 15:30  Time Out: 16:32  Total Appointment Time (timed & untimed codes): 62 minutes      Precautions: Standard    PTA Visit #: 4/5      SUBJECTIVE     Pt reports:  Ongoing posterior shoulder pain with horizontal abduction at 90 degrees or greater with shoulder extension. No present complaints of pain upon arrival to PT treatment session.     He was compliant with home exercise program.  Response to previous treatment:  No adverse effects noted   Functional change:  Improved mobility     Pain: 0/10  Location: left shoulder, popping Left shoulder, superior-posterior     OBJECTIVE     Objective Measures updated at progress report unless specified.     Treatment       Ther Ex to improve strength, ROM, and flexibility.  See flow-sheet below:      TherEx Reps   Arm Bike 5 minutes each    Finger Ladder 5 x 5"   Scapula Retractions with Thera Band  2 x 10, blue *   Shoulder extension with Thera Band  2 x 10, blue *    Ball Rolls up Wall  10 x 10"    Upper Trap Stretch 3 x 30"   Levator Scap Stretch 3 x 30"   Cane Flexion Stretch  10 x 10"   Cane ER Stretch  10 x 5"   Sleeper Stretch  10 x 5"   Serratus Ball on Wall  20 x each    ER walk outs with Thera Tube 10 x blue *    IR walk outs with Thera Tube 10 x blue *    Wall Push UP (Serratus )  20 x    Doorway Stretch  3 x 20"    Dynamic Stability Trampoline 2 x 10 red med ball manual perturbations by LPTA *   Serratus Reach  2 x 10, supine 2 #          Therapeutic " Activities to improve functional and dynamic stability.  See flow-sheet below:  Added resistance to prone Ther Act's.     I,Y,T's    Prone Rows 3 x 10, 2 #  *   Prone Abduction  3 x 10, 2 #  *   Prone Extension 3 x 10, 2#  *      Biphasic E-stim to Left Upper Trap and Rhomboid, and Left Lat and Subscapularis x 12 mins with MHP application to increase blood flow and decrease soft tissue tightness prior to manual therapy.     MANUAL THERAPY TECHNIQUES:  Grade 1 joint mobs/oscillations to Left GH joint (Not Today)    Patient Education and Home Exercises     Home Exercises Provided and Patient Education Provided     Education provided: Patient instructed via demonstration to add Pendulums clockwise and counter-clockwise to HEP  -POC, HEP, DOMS     Written Home Exercises Provided: Patient instructed to cont prior HEP. Exercises were reviewed and Regino was able to demonstrate them prior to the end of the session.  Regino demonstrated good  understanding of the education provided. See EMR under Patient Instructions for exercises provided during therapy sessions    ASSESSMENT     Regino is a 42 y.o. male referred to outpatient Physical Therapy with a medical diagnosis of L shoulder tendinopathy. Pt presents with increased pain at certain ROM, decreased scapular strength, and increased muscle tightness.  Increased resistance/reps as tolerated by patient.  Patient presents with good carryover to complete Ther Ex with proper alignment, speed, count, and hold times.  Patient does not report pain during treatment, but reports popping with first D2 flexion.  No adverse effects noted during treatment session.        Pt prognosis is Good.   Pt will benefit from skilled outpatient Physical Therapy to address the deficits stated above and in the chart below, provide pt/family education, and to maximize pt's level of independence.      Plan of care discussed with patient: Yes  Pt's spiritual, cultural and educational needs considered  and patient is agreeable to the plan of care and goals as stated below:      Anticipated Barriers for therapy: chronic pain, poor posture     Goals:  Pt will increase L shoulder flexion to 120 degrees, external rotation to C4, and internal rotation to L2 for independent dressing  Pt will increase L upper extremity to 4+/5 to allow patient to perform activities of daily living independently  Pt will report decrease in pain to 2/10 to improve quality of life  Pt will place 5 pound object in overhead shelf without hike and with less than or equal to 3/10 pain to allow patient to return to prior level of function      PLAN     Plan of care Certification: 10/17/2022 to 11/14/2022.  Outpatient Physical Therapy 2 times weekly for 4 weeks to include the following interventions: Electrical Stimulation IFC/Biphasic, Gait Training, Manual Therapy, Moist Heat/ Ice, Neuromuscular Re-ed, Patient Education, Self Care, Therapeutic Activities, Therapeutic Exercise, Ultrasound, and dry needling .     Continue POC per PT orders to progress patient toward rehab goals.  NACHO Kang  11/7/2022

## 2022-11-10 DIAGNOSIS — N41.9 PROSTATITIS, UNSPECIFIED PROSTATITIS TYPE: Primary | ICD-10-CM

## 2022-11-10 RX ORDER — DOXYCYCLINE 100 MG/1
CAPSULE ORAL
Qty: 40 CAPSULE | Refills: 0 | Status: SHIPPED | OUTPATIENT
Start: 2022-11-10 | End: 2022-12-21 | Stop reason: ALTCHOICE

## 2022-11-10 NOTE — TELEPHONE ENCOUNTER
Patient's wife called stating he needs something sent in for a flareup of prostatitis to Mr. Thomas in [place]; Dr. Marquez notified, chart reviewed and order given to send in Doxycycline 100mg one BID for 7 days then one daily #40 with no refills. Patient needs to make appointment if he isn't better after completing the medicine. Patient called and notified, verbalized understanding.

## 2022-11-10 NOTE — PLAN OF CARE
Reasons for Recertification of Therapy: increase stability and strengthening     Plan     Updated Certification Period: 11/10/2022 to 12/8/2022  Recommended Treatment Plan: 2 times per week for 4 weeks: Electrical Stimulation IFC/Biphasic, Manual Therapy, Moist Heat/ Ice, Neuromuscular Re-ed, Patient Education, Self Care, Therapeutic Activities, Therapeutic Exercise, and Ultrasound  Other Recommendations: N/A    Kyra Guillaume, PT, DPT   11/10/2022      I CERTIFY THE NEED FOR THESE SERVICES FURNISHED UNDER THIS PLAN OF TREATMENT AND WHILE UNDER MY CARE.    Physician's comments:      Physician's Signature: ___________________________________________________

## 2022-11-22 ENCOUNTER — CLINICAL SUPPORT (OUTPATIENT)
Dept: REHABILITATION | Facility: HOSPITAL | Age: 42
End: 2022-11-22
Payer: COMMERCIAL

## 2022-11-22 ENCOUNTER — OFFICE VISIT (OUTPATIENT)
Dept: PRIMARY CARE CLINIC | Facility: CLINIC | Age: 42
End: 2022-11-22
Payer: COMMERCIAL

## 2022-11-22 VITALS
OXYGEN SATURATION: 96 % | WEIGHT: 275 LBS | RESPIRATION RATE: 20 BRPM | SYSTOLIC BLOOD PRESSURE: 124 MMHG | HEART RATE: 82 BPM | HEIGHT: 72 IN | DIASTOLIC BLOOD PRESSURE: 80 MMHG | TEMPERATURE: 99 F | BODY MASS INDEX: 37.25 KG/M2

## 2022-11-22 DIAGNOSIS — Z12.5 SCREENING FOR MALIGNANT NEOPLASM OF PROSTATE: ICD-10-CM

## 2022-11-22 DIAGNOSIS — Z13.220 SCREENING FOR HYPERLIPIDEMIA: ICD-10-CM

## 2022-11-22 DIAGNOSIS — Z02.89 ENCOUNTER FOR PHYSICAL EXAMINATION RELATED TO EMPLOYMENT: Primary | ICD-10-CM

## 2022-11-22 DIAGNOSIS — R52 PAIN: Primary | ICD-10-CM

## 2022-11-22 DIAGNOSIS — Z13.1 SCREENING FOR DIABETES MELLITUS: ICD-10-CM

## 2022-11-22 LAB
CHOLEST SERPL-MCNC: 246 MG/DL (ref 0–200)
CHOLEST/HDLC SERPL: 4.1 {RATIO}
GLUCOSE SERPL-MCNC: 89 MG/DL (ref 74–106)
HDLC SERPL-MCNC: 60 MG/DL (ref 40–60)
LDLC SERPL CALC-MCNC: 147 MG/DL
LDLC/HDLC SERPL: 2.5 {RATIO}
NONHDLC SERPL-MCNC: 186 MG/DL
PSA SERPL-MCNC: 0.56 NG/ML
TRIGL SERPL-MCNC: 194 MG/DL (ref 35–150)
VLDLC SERPL-MCNC: 39 MG/DL

## 2022-11-22 PROCEDURE — 97110 THERAPEUTIC EXERCISES: CPT

## 2022-11-22 PROCEDURE — 80061 LIPID PANEL: ICD-10-PCS | Mod: ,,, | Performed by: CLINICAL MEDICAL LABORATORY

## 2022-11-22 PROCEDURE — 3074F PR MOST RECENT SYSTOLIC BLOOD PRESSURE < 130 MM HG: ICD-10-PCS | Mod: CPTII,,, | Performed by: FAMILY MEDICINE

## 2022-11-22 PROCEDURE — 99396 PR PREVENTIVE VISIT,EST,40-64: ICD-10-PCS | Mod: ,,, | Performed by: FAMILY MEDICINE

## 2022-11-22 PROCEDURE — 80061 LIPID PANEL: CPT | Mod: ,,, | Performed by: CLINICAL MEDICAL LABORATORY

## 2022-11-22 PROCEDURE — 3079F PR MOST RECENT DIASTOLIC BLOOD PRESSURE 80-89 MM HG: ICD-10-PCS | Mod: CPTII,,, | Performed by: FAMILY MEDICINE

## 2022-11-22 PROCEDURE — 1159F PR MEDICATION LIST DOCUMENTED IN MEDICAL RECORD: ICD-10-PCS | Mod: CPTII,,, | Performed by: FAMILY MEDICINE

## 2022-11-22 PROCEDURE — 82947 GLUCOSE, RANDOM: ICD-10-PCS | Mod: ,,, | Performed by: CLINICAL MEDICAL LABORATORY

## 2022-11-22 PROCEDURE — 3008F BODY MASS INDEX DOCD: CPT | Mod: CPTII,,, | Performed by: FAMILY MEDICINE

## 2022-11-22 PROCEDURE — G0103 PSA, SCREENING: ICD-10-PCS | Mod: ,,, | Performed by: CLINICAL MEDICAL LABORATORY

## 2022-11-22 PROCEDURE — 1159F MED LIST DOCD IN RCRD: CPT | Mod: CPTII,,, | Performed by: FAMILY MEDICINE

## 2022-11-22 PROCEDURE — 3079F DIAST BP 80-89 MM HG: CPT | Mod: CPTII,,, | Performed by: FAMILY MEDICINE

## 2022-11-22 PROCEDURE — 3074F SYST BP LT 130 MM HG: CPT | Mod: CPTII,,, | Performed by: FAMILY MEDICINE

## 2022-11-22 PROCEDURE — 3008F PR BODY MASS INDEX (BMI) DOCUMENTED: ICD-10-PCS | Mod: CPTII,,, | Performed by: FAMILY MEDICINE

## 2022-11-22 PROCEDURE — 99396 PREV VISIT EST AGE 40-64: CPT | Mod: ,,, | Performed by: FAMILY MEDICINE

## 2022-11-22 PROCEDURE — G0103 PSA SCREENING: HCPCS | Mod: ,,, | Performed by: CLINICAL MEDICAL LABORATORY

## 2022-11-22 PROCEDURE — 82947 ASSAY GLUCOSE BLOOD QUANT: CPT | Mod: ,,, | Performed by: CLINICAL MEDICAL LABORATORY

## 2022-11-22 NOTE — PROGRESS NOTES
"  [facility]   Physical Therapy Treatment Note     Name: Regino Will  Clinic Number: [id number]    Therapy Diagnosis:   Encounter Diagnosis   Name Primary?    Pain Yes       Physician: Alex Roberts MD    Visit Date: 11/22/2022       Physician Orders: PT Eval and Treat   Medical Diagnosis from Referral: L shoulder tendinopathy  Evaluation Date: 10/17/2022  Authorization Period Expiration: 10/6/2023  Plan of Care Expiration: 11/14/2022  Visit # / Visits authorized: 7/16     Time In: 9:37  Time Out: 10:39  Total Appointment Time (timed & untimed codes):  62 minutes      Precautions: Standard    PTA Visit #: 0/5      SUBJECTIVE     Pt reports:  "It's about the same. It seems like it isn't as much anymore but it still pops."     He was compliant with home exercise program.  Response to previous treatment:  No adverse effects noted   Functional change:  Improved mobility     Pain: 0/10  Location: left shoulder, popping Left shoulder, superior-posterior     OBJECTIVE     Objective Measures updated at progress report unless specified.     Treatment     Ther Ex to improve strength, ROM, and flexibility.  See flow-sheet below:      TherEx Reps   Arm Bike 5 minutes each    Finger Ladder 5 x 5"   Scapula Retractions with Thera Band  2 x 10, blue    Shoulder extension with Thera Band  2 x 10, blue    Ball Rolls up Wall  10 x 10"    Upper Trap Stretch 3 x 30"   Levator Scap Stretch 3 x 30"   Cane Flexion Stretch  10 x 10"   Cane ER Stretch  10 x 5"   Sleeper Stretch  10 x 5"   Serratus Ball on Wall  30 x each    ER walk outs with Thera Tube 10 x blue    IR walk outs with Thera Tube 10 x blue    Wall Push UP (Serratus )  20 x    Doorway Stretch  3 x 20"    Dynamic Stability Trampoline 2 x 10 red med ball manual perturbations by LPTA *   Serratus Reach  2 x 10, supine 2 #    Body Blade (side and up/down)  2 x 30" each        Therapeutic Activities to improve functional and dynamic stability.  See " flow-sheet below:      I,Y,T's 3 x 10 2#    Prone Rows 3 x 10, 2 #     Prone Abduction  3 x 10, 2 #     Prone Extension 3 x 10, 2#         Patient Education and Home Exercises     Home Exercises Provided and Patient Education Provided     Education provided: Patient instructed via demonstration to add Pendulums clockwise and counter-clockwise to HEP  -POC, HEP, DOMS     Written Home Exercises Provided: Patient instructed to cont prior HEP. Exercises were reviewed and Regino was able to demonstrate them prior to the end of the session.  Regino demonstrated good  understanding of the education provided. See EMR under Patient Instructions for exercises provided during therapy sessions    ASSESSMENT     Regino is a 42 y.o. male referred to outpatient Physical Therapy with a medical diagnosis of L shoulder tendinopathy. Pt presents with increased pain at certain ROM, decreased scapular strength, and increased muscle tightness.  PT did not increase reps or resistance today due to increased time since patient's last PT visit due to sickness and work. PT provided patient with verbal cues and visual demonstrations for proper posture, form, and decreased compensations with therapy tasks. PT added body blade stability exercises to continue improving L shoulder stability. Patient had no reports of pain only muscle fatigue during exercises. Patient reported no pain and declined modalities post treatment.        Pt prognosis is Good.   Pt will benefit from skilled outpatient Physical Therapy to address the deficits stated above and in the chart below, provide pt/family education, and to maximize pt's level of independence.      Plan of care discussed with patient: Yes  Pt's spiritual, cultural and educational needs considered and patient is agreeable to the plan of care and goals as stated below:      Anticipated Barriers for therapy: chronic pain, poor posture     Goals:  Pt will increase L shoulder flexion to 120 degrees,  external rotation to C4, and internal rotation to L2 for independent dressing  Pt will increase L upper extremity to 4+/5 to allow patient to perform activities of daily living independently  Pt will report decrease in pain to 2/10 to improve quality of life  Pt will place 5 pound object in overhead shelf without hike and with less than or equal to 3/10 pain to allow patient to return to prior level of function      PLAN     Plan of care Certification: 10/17/2022 to 11/14/2022.  Outpatient Physical Therapy 2 times weekly for 4 weeks to include the following interventions: Electrical Stimulation IFC/Biphasic, Gait Training, Manual Therapy, Moist Heat/ Ice, Neuromuscular Re-ed, Patient Education, Self Care, Therapeutic Activities, Therapeutic Exercise, Ultrasound, and dry needling .     Continue POC per PT orders to progress patient toward rehab goals.  Evgeny Goff, PT, DPT    11/22/2022

## 2022-11-22 NOTE — PROGRESS NOTES
Subjective:       Patient ID: Regino Will is a 42 y.o. male.    Chief Complaint: Annual Exam (Health screening for insurance. Waist 38in)    Here for insurance physical    Review of Systems   Constitutional:  Negative for fatigue and fever.   HENT:  Negative for dental problem.    Eyes:  Negative for discharge.   Respiratory:  Negative for cough, choking, chest tightness and shortness of breath.    Cardiovascular:  Negative for chest pain and leg swelling.   Gastrointestinal:  Negative for constipation, diarrhea, nausea and vomiting.   Genitourinary:  Negative for discharge and flank pain.   Musculoskeletal:  Negative for arthralgias and myalgias.   Allergic/Immunologic: Negative for environmental allergies.   Neurological:  Negative for headaches and memory loss.   Psychiatric/Behavioral:  Negative for behavioral problems and hallucinations.        Objective:      Physical Exam  Vitals and nursing note reviewed.   Constitutional:       Appearance: Normal appearance. He is normal weight.   HENT:      Head: Normocephalic and atraumatic.      Right Ear: Tympanic membrane normal.      Left Ear: Tympanic membrane normal.      Nose: Nose normal.      Mouth/Throat:      Mouth: Mucous membranes are moist.   Eyes:      Extraocular Movements: Extraocular movements intact.      Conjunctiva/sclera: Conjunctivae normal.      Pupils: Pupils are equal, round, and reactive to light.   Cardiovascular:      Rate and Rhythm: Normal rate and regular rhythm.      Pulses: Normal pulses.   Pulmonary:      Effort: Pulmonary effort is normal.      Breath sounds: Normal breath sounds.   Abdominal:      General: Abdomen is flat. Bowel sounds are normal.      Palpations: Abdomen is soft.   Musculoskeletal:         General: Normal range of motion.      Cervical back: Normal range of motion and neck supple.   Skin:     General: Skin is warm and dry.   Neurological:      General: No focal deficit present.      Mental Status: He is alert and  oriented to person, place, and time.   Psychiatric:         Mood and Affect: Mood normal.       Assessment:       Problem List Items Addressed This Visit          Other    Encounter for physical examination related to employment - Primary       Plan:       RTC as needed

## 2022-11-23 ENCOUNTER — CLINICAL SUPPORT (OUTPATIENT)
Dept: REHABILITATION | Facility: HOSPITAL | Age: 42
End: 2022-11-23
Payer: COMMERCIAL

## 2022-11-23 DIAGNOSIS — R52 PAIN: Primary | ICD-10-CM

## 2022-11-23 PROCEDURE — 97110 THERAPEUTIC EXERCISES: CPT

## 2022-11-23 PROCEDURE — 97530 THERAPEUTIC ACTIVITIES: CPT

## 2022-11-23 NOTE — PROGRESS NOTES
"  [facility]   Physical Therapy Treatment Note     Name: Regino Will  Clinic Number: [id number]    Therapy Diagnosis:   Encounter Diagnosis   Name Primary?    Pain Yes       Physician: Alex Roberts MD    Visit Date: 11/23/2022       Physician Orders: PT Eval and Treat   Medical Diagnosis from Referral: L shoulder tendinopathy  Evaluation Date: 10/17/2022  Authorization Period Expiration: 10/6/2023  Plan of Care Expiration: 11/14/2022  Visit # / Visits authorized: 8/16     Time In: 7:57  Time Out: 8:58  Total Appointment Time (timed & untimed codes):  61 minutes      Precautions: Standard    PTA Visit #: 0/5      SUBJECTIVE     Pt reports:  "I am sore from yesterday."     He was compliant with home exercise program.  Response to previous treatment:  No adverse effects noted   Functional change:  Improved mobility     Pain: 0/10  Location: left shoulder, popping Left shoulder, superior-posterior     OBJECTIVE     Objective Measures updated at progress report unless specified.     Treatment     Ther Ex to improve strength, ROM, and flexibility.  See flow-sheet below:      TherEx Reps   Arm Bike 5 minutes each    Finger Ladder 5 x 5"   Scapula Retractions with Thera Band  2 x 10, blue    Shoulder extension with Thera Band  2 x 10, blue    Ball Rolls up Wall  10 x 10"    Upper Trap Stretch 3 x 30"   Levator Scap Stretch 3 x 30"   Cane Flexion Stretch  10 x 10"   Cane ER Stretch  10 x 5"   Sleeper Stretch  10 x 5"   Serratus Ball on Wall  30 x each    ER walk outs with Thera Tube 10 x blue    IR walk outs with Thera Tube 10 x blue    Wall Push UP (Serratus )  20 x    Doorway Stretch  3 x 20"    Dynamic Stability Trampoline 2 x 10 red med ball manual perturbations (not performed)   Serratus Reach  2 x 10, supine 2 #    Body Blade (side and up/down)  2 x 30" each        Therapeutic Activities to improve functional and dynamic stability.  See flow-sheet below:      I,Y,T's 3 x 10 1#    Prone " Rows 3 x 10, 2 #     Prone Abduction  3 x 10, 2 #     Prone Extension 3 x 10, 2#         Patient Education and Home Exercises     Home Exercises Provided and Patient Education Provided     Education provided: Patient instructed via demonstration to add Pendulums clockwise and counter-clockwise to HEP  -POC, HEP, DOMS     Written Home Exercises Provided: Patient instructed to cont prior HEP. Exercises were reviewed and Regino was able to demonstrate them prior to the end of the session.  Regino demonstrated good  understanding of the education provided. See EMR under Patient Instructions for exercises provided during therapy sessions    ASSESSMENT     Regino is a 42 y.o. male referred to outpatient Physical Therapy with a medical diagnosis of L shoulder tendinopathy. Pt presents with increased pain at certain ROM, decreased scapular strength, and increased muscle tightness.  PT did not increase reps or resistance today due to increased time since patient's last PT visit due to sickness and work. PT provided patient with verbal cues and visual demonstrations for proper posture, form, and decreased compensations with therapy tasks. PT added body blade stability exercises to continue improving L shoulder stability. Patient with reports of soreness right at tip of acromion. Pt had muscle fatigue during exercises. Patient declined modalities post treatment.      Pt prognosis is Good.   Pt will benefit from skilled outpatient Physical Therapy to address the deficits stated above and in the chart below, provide pt/family education, and to maximize pt's level of independence.      Plan of care discussed with patient: Yes  Pt's spiritual, cultural and educational needs considered and patient is agreeable to the plan of care and goals as stated below:      Anticipated Barriers for therapy: chronic pain, poor posture     Goals:  Pt will increase L shoulder flexion to 120 degrees, external rotation to C4, and internal rotation  to L2 for independent dressing  Pt will increase L upper extremity to 4+/5 to allow patient to perform activities of daily living independently  Pt will report decrease in pain to 2/10 to improve quality of life  Pt will place 5 pound object in overhead shelf without hike and with less than or equal to 3/10 pain to allow patient to return to prior level of function      PLAN     Plan of care Certification: 10/17/2022 to 11/14/2022.  Outpatient Physical Therapy 2 times weekly for 4 weeks to include the following interventions: Electrical Stimulation IFC/Biphasic, Gait Training, Manual Therapy, Moist Heat/ Ice, Neuromuscular Re-ed, Patient Education, Self Care, Therapeutic Activities, Therapeutic Exercise, Ultrasound, and dry needling .     Continue POC per PT orders to progress patient toward rehab goals.    Kyra Guillaume, PT, DPT    11/23/2022

## 2022-11-29 ENCOUNTER — CLINICAL SUPPORT (OUTPATIENT)
Dept: REHABILITATION | Facility: HOSPITAL | Age: 42
End: 2022-11-29
Payer: COMMERCIAL

## 2022-11-29 DIAGNOSIS — R52 PAIN: Primary | ICD-10-CM

## 2022-11-29 PROCEDURE — 97110 THERAPEUTIC EXERCISES: CPT | Mod: CQ

## 2022-11-29 PROCEDURE — 97530 THERAPEUTIC ACTIVITIES: CPT | Mod: CQ

## 2022-11-29 NOTE — PROGRESS NOTES
"  [facility]   Physical Therapy Treatment Note     Name: Regino Will  Clinic Number: [id number]    Therapy Diagnosis:   Encounter Diagnosis   Name Primary?    Pain Yes       Physician: Alex Roberts MD    Visit Date: 11/29/2022       Physician Orders: PT Eval and Treat   Medical Diagnosis from Referral: L shoulder tendinopathy  Evaluation Date: 10/17/2022  Authorization Period Expiration: 10/6/2023  Plan of Care Expiration: 11/14/2022  Visit # / Visits authorized: 9/16     Time In: 15:45 (Late check in due to office staff being busy)  Time Out: 16:24  Total Appointment Time (timed & untimed codes):  39 minutes      Precautions: Standard    PTA Visit #: 1/5      SUBJECTIVE     Pt reports:  "I'm good today but I was sore."     He was compliant with home exercise program.  Response to previous treatment:  No adverse effects noted   Functional change:  Improved mobility     Pain: 0/10  Location: left shoulder, popping Left shoulder, superior-posterior     OBJECTIVE     Objective Measures updated at progress report unless specified.     Treatment     Ther Ex to improve strength, ROM, and flexibility.  See flow-sheet below:      TherEx Reps   Arm Bike 5 minutes each    Finger Ladder 5 x 5"   Scapula Retractions with Thera Band  2 x 10, blue    Shoulder extension with Thera Band  2 x 10, blue    Ball Rolls up Wall  10 x 10"    Upper Trap Stretch 3 x 30"   Levator Scap Stretch 3 x 30"   Cane Flexion Stretch  10 x 10"   Cane ER Stretch  10 x 5"   Sleeper Stretch  10 x 5"   Serratus Ball on Wall  30 x each    ER walk outs with Thera Tube 10 x blue    IR walk outs with Thera Tube 10 x blue    Wall Push UP (Serratus )  20 x    Doorway Stretch  3 x 20"    Dynamic Stability Trampoline 2 x 10 red med ball manual perturbations (not performed)   Serratus Reach  2 x 10, supine 2 #    Body Blade (side and up/down)  2 x 30" each    Wall Clocks 6 x yellow TB *   Spider Letty 5 x yellow TB *   Push outs  2 " x 10 blue TT *       Therapeutic Activities to improve functional and dynamic stability.  See flow-sheet below:      I,Y,T's 3 x 10 1#    Prone Rows 3 x 10, 2 #     Prone Abduction  3 x 10, 2 #     Prone Extension 3 x 10, 2#         Patient Education and Home Exercises     Home Exercises Provided and Patient Education Provided     Education provided: Patient instructed via demonstration to add Pendulums clockwise and counter-clockwise to HEP  -POC, HEP, DOMS     Written Home Exercises Provided: Patient instructed to cont prior HEP. Exercises were reviewed and Regino was able to demonstrate them prior to the end of the session.  Regino demonstrated good  understanding of the education provided. See EMR under Patient Instructions for exercises provided during therapy sessions    ASSESSMENT     Regino is a 42 y.o. male referred to outpatient Physical Therapy with a medical diagnosis of L shoulder tendinopathy. Pt presents with increased pain at certain ROM, decreased scapular strength, and increased muscle tightness.  LPTA added exercises to further challenge pt.  Pt provided  verbal cues and visual demonstrations for proper posture, form, and decreased compensations with therapy tasks. Pt displayed some muscle fatigue with added exercises. No adverse effects noted. Pt declined modalities post tx.      Pt prognosis is Good.   Pt will benefit from skilled outpatient Physical Therapy to address the deficits stated above and in the chart below, provide pt/family education, and to maximize pt's level of independence.      Plan of care discussed with patient: Yes  Pt's spiritual, cultural and educational needs considered and patient is agreeable to the plan of care and goals as stated below:      Anticipated Barriers for therapy: chronic pain, poor posture     Goals:  Pt will increase L shoulder flexion to 120 degrees, external rotation to C4, and internal rotation to L2 for independent dressing  Pt will increase L upper  extremity to 4+/5 to allow patient to perform activities of daily living independently  Pt will report decrease in pain to 2/10 to improve quality of life  Pt will place 5 pound object in overhead shelf without hike and with less than or equal to 3/10 pain to allow patient to return to prior level of function      PLAN     Plan of care Certification: 10/17/2022 to 11/14/2022.  Outpatient Physical Therapy 2 times weekly for 4 weeks to include the following interventions: Electrical Stimulation IFC/Biphasic, Gait Training, Manual Therapy, Moist Heat/ Ice, Neuromuscular Re-ed, Patient Education, Self Care, Therapeutic Activities, Therapeutic Exercise, Ultrasound, and dry needling .     Continue POC per PT orders to progress patient toward rehab goals.    NACHO Garcia   11/29/2022

## 2022-12-02 ENCOUNTER — DOCUMENTATION ONLY (OUTPATIENT)
Dept: REHABILITATION | Facility: HOSPITAL | Age: 42
End: 2022-12-02
Payer: COMMERCIAL

## 2022-12-02 NOTE — PROGRESS NOTES
Outpatient Therapy Discharge Summary     Name: Regino Will  [facility] Number: [id number]    Therapy Diagnosis: No diagnosis found.  Physician: No ref. provider found    Physician Orders: PT Eval and Treat   Medical Diagnosis from Referral: L shoulder tendinopathy  Evaluation Date: 10/17/2022    Date of Last visit: 11/29/2022  Total Visits Received: 9  Cancelled Visits: 3  No Show Visits: 2    Assessment    Goals:MET   Pt will increase L shoulder flexion to 120 degrees, external rotation to C4, and internal rotation to L2 for independent dressing  Pt will increase L upper extremity to 4+/5 to allow patient to perform activities of daily living independently  Pt will report decrease in pain to 2/10 to improve quality of life  Pt will place 5 pound object in overhead shelf without hike and with less than or equal to 3/10 pain to allow patient to return to prior level of function      Discharge reason: Patient has met all of his/her goals. Patient has been inconsistent with PT treatment due to work. Patient would benefit from continued strengthening to improve stability. Patient discharged to [facility].     Plan   This patient is discharged from Physical Therapy.     Kyra Guillaume, PT, DPT 12/2/2022

## 2022-12-21 ENCOUNTER — OFFICE VISIT (OUTPATIENT)
Dept: PRIMARY CARE CLINIC | Facility: CLINIC | Age: 42
End: 2022-12-21
Payer: COMMERCIAL

## 2022-12-21 VITALS
OXYGEN SATURATION: 97 % | DIASTOLIC BLOOD PRESSURE: 84 MMHG | RESPIRATION RATE: 17 BRPM | SYSTOLIC BLOOD PRESSURE: 128 MMHG | WEIGHT: 278 LBS | TEMPERATURE: 99 F | HEART RATE: 82 BPM | BODY MASS INDEX: 37.65 KG/M2 | HEIGHT: 72 IN

## 2022-12-21 DIAGNOSIS — H66.004 RECURRENT ACUTE SUPPURATIVE OTITIS MEDIA OF RIGHT EAR WITHOUT SPONTANEOUS RUPTURE OF TYMPANIC MEMBRANE: Primary | ICD-10-CM

## 2022-12-21 PROCEDURE — 3008F PR BODY MASS INDEX (BMI) DOCUMENTED: ICD-10-PCS | Mod: CPTII,,, | Performed by: FAMILY MEDICINE

## 2022-12-21 PROCEDURE — 1160F RVW MEDS BY RX/DR IN RCRD: CPT | Mod: CPTII,,, | Performed by: FAMILY MEDICINE

## 2022-12-21 PROCEDURE — 3079F DIAST BP 80-89 MM HG: CPT | Mod: CPTII,,, | Performed by: FAMILY MEDICINE

## 2022-12-21 PROCEDURE — 1159F PR MEDICATION LIST DOCUMENTED IN MEDICAL RECORD: ICD-10-PCS | Mod: CPTII,,, | Performed by: FAMILY MEDICINE

## 2022-12-21 PROCEDURE — 3008F BODY MASS INDEX DOCD: CPT | Mod: CPTII,,, | Performed by: FAMILY MEDICINE

## 2022-12-21 PROCEDURE — 99213 OFFICE O/P EST LOW 20 MIN: CPT | Mod: 25,,, | Performed by: FAMILY MEDICINE

## 2022-12-21 PROCEDURE — 1159F MED LIST DOCD IN RCRD: CPT | Mod: CPTII,,, | Performed by: FAMILY MEDICINE

## 2022-12-21 PROCEDURE — 3079F PR MOST RECENT DIASTOLIC BLOOD PRESSURE 80-89 MM HG: ICD-10-PCS | Mod: CPTII,,, | Performed by: FAMILY MEDICINE

## 2022-12-21 PROCEDURE — 96372 PR INJECTION,THERAP/PROPH/DIAG2ST, IM OR SUBCUT: ICD-10-PCS | Mod: ,,, | Performed by: FAMILY MEDICINE

## 2022-12-21 PROCEDURE — 99213 PR OFFICE/OUTPT VISIT, EST, LEVL III, 20-29 MIN: ICD-10-PCS | Mod: 25,,, | Performed by: FAMILY MEDICINE

## 2022-12-21 PROCEDURE — 3074F SYST BP LT 130 MM HG: CPT | Mod: CPTII,,, | Performed by: FAMILY MEDICINE

## 2022-12-21 PROCEDURE — 1160F PR REVIEW ALL MEDS BY PRESCRIBER/CLIN PHARMACIST DOCUMENTED: ICD-10-PCS | Mod: CPTII,,, | Performed by: FAMILY MEDICINE

## 2022-12-21 PROCEDURE — 96372 THER/PROPH/DIAG INJ SC/IM: CPT | Mod: ,,, | Performed by: FAMILY MEDICINE

## 2022-12-21 PROCEDURE — 3074F PR MOST RECENT SYSTOLIC BLOOD PRESSURE < 130 MM HG: ICD-10-PCS | Mod: CPTII,,, | Performed by: FAMILY MEDICINE

## 2022-12-21 RX ORDER — CEFDINIR 300 MG/1
300 CAPSULE ORAL 2 TIMES DAILY
Qty: 20 CAPSULE | Refills: 0 | Status: SHIPPED | OUTPATIENT
Start: 2022-12-21 | End: 2022-12-31

## 2022-12-21 RX ORDER — BETAMETHASONE SODIUM PHOSPHATE AND BETAMETHASONE ACETATE 3; 3 MG/ML; MG/ML
6 INJECTION, SUSPENSION INTRA-ARTICULAR; INTRALESIONAL; INTRAMUSCULAR; SOFT TISSUE
Status: COMPLETED | OUTPATIENT
Start: 2022-12-21 | End: 2022-12-21

## 2022-12-21 RX ORDER — CEFTRIAXONE 1 G/1
1 INJECTION, POWDER, FOR SOLUTION INTRAMUSCULAR; INTRAVENOUS
Status: COMPLETED | OUTPATIENT
Start: 2022-12-21 | End: 2022-12-21

## 2022-12-21 RX ORDER — METHYLPREDNISOLONE 4 MG/1
TABLET ORAL
Qty: 21 EACH | Refills: 0 | Status: SHIPPED | OUTPATIENT
Start: 2022-12-21 | End: 2023-01-11

## 2022-12-21 RX ADMIN — BETAMETHASONE SODIUM PHOSPHATE AND BETAMETHASONE ACETATE 6 MG: 3; 3 INJECTION, SUSPENSION INTRA-ARTICULAR; INTRALESIONAL; INTRAMUSCULAR; SOFT TISSUE at 01:12

## 2022-12-21 RX ADMIN — CEFTRIAXONE 1 G: 1 INJECTION, POWDER, FOR SOLUTION INTRAMUSCULAR; INTRAVENOUS at 01:12

## 2022-12-21 NOTE — PROGRESS NOTES
Subjective:       Patient ID: Regino Will is a 42 y.o. male.    Chief Complaint: Ear Fullness (C/O right ear giving him problems.)    Can't hear out of right ear    Ear Fullness   Pertinent negatives include no coughing, diarrhea, headaches or vomiting.   Review of Systems   Constitutional:  Negative for fatigue and fever.   HENT:  Positive for sinus pressure/congestion. Negative for dental problem.    Eyes:  Negative for discharge.   Respiratory:  Negative for cough, choking, chest tightness and shortness of breath.    Cardiovascular:  Negative for chest pain and leg swelling.   Gastrointestinal:  Negative for constipation, diarrhea, nausea and vomiting.   Genitourinary:  Negative for discharge and flank pain.   Musculoskeletal:  Negative for arthralgias and myalgias.   Allergic/Immunologic: Negative for environmental allergies.   Neurological:  Negative for headaches and memory loss.   Psychiatric/Behavioral:  Negative for behavioral problems and hallucinations.        Objective:      Physical Exam  Vitals and nursing note reviewed.   Constitutional:       Appearance: Normal appearance. He is normal weight.   HENT:      Head: Normocephalic and atraumatic.      Right Ear: A middle ear effusion is present. Tympanic membrane is erythematous.      Left Ear: A middle ear effusion is present.      Nose: Nose normal.      Mouth/Throat:      Mouth: Mucous membranes are moist.   Eyes:      Extraocular Movements: Extraocular movements intact.      Conjunctiva/sclera: Conjunctivae normal.      Pupils: Pupils are equal, round, and reactive to light.   Cardiovascular:      Rate and Rhythm: Normal rate and regular rhythm.      Pulses: Normal pulses.   Pulmonary:      Effort: Pulmonary effort is normal.      Breath sounds: Normal breath sounds.   Abdominal:      General: Abdomen is flat. Bowel sounds are normal.      Palpations: Abdomen is soft.   Musculoskeletal:         General: Normal range of motion.      Cervical back:  Normal range of motion and neck supple.   Skin:     General: Skin is warm and dry.   Neurological:      General: No focal deficit present.      Mental Status: He is alert and oriented to person, place, and time.   Psychiatric:         Mood and Affect: Mood normal.       Assessment:       Problem List Items Addressed This Visit          ENT    Recurrent acute suppurative otitis media of right ear without spontaneous rupture of tympanic membrane - Primary    Relevant Medications    cefTRIAXone injection 1 g (Start on 12/21/2022  1:45 PM)    betamethasone acetate-betamethasone sodium phosphate injection 6 mg (Start on 12/21/2022  1:45 PM)    cefdinir (OMNICEF) 300 MG capsule    methylPREDNISolone (MEDROL DOSEPACK) 4 mg tablet       Plan:       RTC 2 weeks

## 2023-02-27 DIAGNOSIS — N41.9 PROSTATITIS, UNSPECIFIED PROSTATITIS TYPE: Primary | ICD-10-CM

## 2023-02-27 RX ORDER — DOXYCYCLINE 100 MG/1
CAPSULE ORAL
Qty: 40 CAPSULE | Refills: 0 | Status: SHIPPED | OUTPATIENT
Start: 2023-02-27

## 2023-02-27 NOTE — TELEPHONE ENCOUNTER
Patient called leaving message with call center stating he feels he has a prostate infection.   Last seen in 2020; last treatment for prostatitis was on 11/10/2022 with Doxycyline 100mg one bid for 7 days then one daily til completed #40 with no refills.  Patient was called back, spoke with wife, she states he has not had any fever but a burning sensation and that back in November when he was treated with Doxycyline he stopped taking the medication when he got to feeling better and started it back up recently when the burning started back up.  Dr. Marquez notified, chart reviewed, instructions to send in another prescription for Doxycyline as above and to make patient an appointment to see him in 6 weeks. Wife was called back and notified, she verbalized understanding.

## 2023-04-10 ENCOUNTER — OFFICE VISIT (OUTPATIENT)
Dept: UROLOGY | Facility: CLINIC | Age: 43
End: 2023-04-10
Payer: COMMERCIAL

## 2023-04-10 VITALS
HEART RATE: 83 BPM | SYSTOLIC BLOOD PRESSURE: 125 MMHG | HEIGHT: 72 IN | DIASTOLIC BLOOD PRESSURE: 76 MMHG | WEIGHT: 279 LBS | BODY MASS INDEX: 37.79 KG/M2

## 2023-04-10 DIAGNOSIS — N41.0 ACUTE PROSTATITIS: ICD-10-CM

## 2023-04-10 DIAGNOSIS — N41.1 CHRONIC PROSTATITIS: Primary | ICD-10-CM

## 2023-04-10 DIAGNOSIS — Z12.5 SCREENING FOR PROSTATE CANCER: ICD-10-CM

## 2023-04-10 PROCEDURE — 99214 OFFICE O/P EST MOD 30 MIN: CPT | Mod: PBBFAC | Performed by: UROLOGY

## 2023-04-10 PROCEDURE — 3074F SYST BP LT 130 MM HG: CPT | Mod: ,,, | Performed by: UROLOGY

## 2023-04-10 PROCEDURE — 1159F PR MEDICATION LIST DOCUMENTED IN MEDICAL RECORD: ICD-10-PCS | Mod: ,,, | Performed by: UROLOGY

## 2023-04-10 PROCEDURE — 1159F MED LIST DOCD IN RCRD: CPT | Mod: ,,, | Performed by: UROLOGY

## 2023-04-10 PROCEDURE — 3008F PR BODY MASS INDEX (BMI) DOCUMENTED: ICD-10-PCS | Mod: ,,, | Performed by: UROLOGY

## 2023-04-10 PROCEDURE — 99214 OFFICE O/P EST MOD 30 MIN: CPT | Mod: S$PBB,,, | Performed by: UROLOGY

## 2023-04-10 PROCEDURE — 3074F PR MOST RECENT SYSTOLIC BLOOD PRESSURE < 130 MM HG: ICD-10-PCS | Mod: ,,, | Performed by: UROLOGY

## 2023-04-10 PROCEDURE — 3008F BODY MASS INDEX DOCD: CPT | Mod: ,,, | Performed by: UROLOGY

## 2023-04-10 PROCEDURE — 1160F PR REVIEW ALL MEDS BY PRESCRIBER/CLIN PHARMACIST DOCUMENTED: ICD-10-PCS | Mod: ,,, | Performed by: UROLOGY

## 2023-04-10 PROCEDURE — 99214 PR OFFICE/OUTPT VISIT, EST, LEVL IV, 30-39 MIN: ICD-10-PCS | Mod: S$PBB,,, | Performed by: UROLOGY

## 2023-04-10 PROCEDURE — 3078F DIAST BP <80 MM HG: CPT | Mod: ,,, | Performed by: UROLOGY

## 2023-04-10 PROCEDURE — 1160F RVW MEDS BY RX/DR IN RCRD: CPT | Mod: ,,, | Performed by: UROLOGY

## 2023-04-10 PROCEDURE — 3078F PR MOST RECENT DIASTOLIC BLOOD PRESSURE < 80 MM HG: ICD-10-PCS | Mod: ,,, | Performed by: UROLOGY

## 2023-04-10 NOTE — PROGRESS NOTES
Subjective     Patient ID: Regino Fraire is a 42 y.o. male.    Chief Complaint: Follow-up (Six weeks office visit follow up. )      History of Present Illness  Mr. Fraire was originally seen by me October 21, 2009 and treated as a bacterial prostatitis at that time, but urine culture when he was here was negative however. Patient's symptoms of UTI cleared completely with treatment with Bactrim DS and he had no further problem until just recently.  He developed dysuria and chills and fever and felt terrible about a month ago. We treated with Cipro for 2 weeks and he completed that  about 2 weeks ago. Symptoms cleared totally and he is not symptomatic now and he feels perfectly normal at present. He did not have a urine culture done.  Only problems he had in the past was a straddle injury in 1988 but he has had no slowing of stream or other problems. No history of stones, no lateralizing pain, no hematuria.    The above-note is the original note from September 12, 2012. Mr. fraire did not improve with  antimicrobials. He had trouble with Bactrim this time and had a reaction. We switched him to Cipro. The patient did not seem to improve on this. He had Rust-colored urine about 3 weeks ago suggesting probable blood in the urine. This did occur just after he had a problem with gastroenteritis. He is over the stomach and GI irritation now. Urine has cleared. Still not feeling great and returned with a renal ultrasound and cystoscopy. This is because of failure to respond to conservative treatment. Patient agreeable to going ahead with workup.    Last note above is note of January 24, 2013 when  Mr. fraire had his workup. His culture that day was negative. He was treated with doxycycline and has done much better since that time. He is still on the doxycycline  and feels this has done a good job in helping his symptoms. No overt symptoms of prostatitis currently.  [April 19, 2013]    Mr. Fraire has not been seen in over 7  years. He has been having problems in recent past with discomfort in the retropubic area and pelvic area. He has noticed the consistency of his semen is thicker compared to what he considered normal. He's had some burning with ejaculation as well as burning sensation in his urethra not associated with urination or ejaculation. Problem has been going on for weeks. He's had some decreased size and force to stream. After he was seen in 2013 passed a large stone without much trouble. He's had no stone problems since.  [December 17, 2020]  ----------------------------------------------------------------------------------------------------------------------------------------------------------------------------------  The above notes are from the old electronic medical record.    PSA was 0.556 on November 22, 2022    Mr. Will called on February 27th and said he was having flare-up of his prostatitis.  We called him in doxycycline that he took for approximately 1 month.  His symptoms  cleared and he is having no further burning on urination or irritative symptoms.  He is back to normal.  No nocturia and no voiding difficulty currently.  He had a normal PSA November 22nd.  No family history of prostate cancer.  Asymptomatic currently. [April 10, 2023]    Review of Systems       Objective     Physical Exam  Constitutional:       Appearance: Normal appearance. He is normal weight.   Genitourinary:     Prostate: Normal.      Rectum: Normal.      Comments: Prostate gland is 20-30 g smooth firm and symmetrical.  Neurological:      Mental Status: He is alert.   Psychiatric:         Mood and Affect: Mood normal.         Behavior: Behavior normal.      Urinalysis showed occasional pus cells.  Dipstick negative with pH 5.0 and specific gravity 1.025.  Assessment and Plan     Problem List Items Addressed This Visit    None  Visit Diagnoses       Chronic prostatitis    -  Primary    Acute prostatitis        Resolved    Screening for  prostate cancer            Acute prostatitis seems to be resolved.  PSA on November 22nd was low normal at 0.556.  Patient will not need additional screening for prostate cancer until age 50.  I will see again for problems.

## 2023-04-10 NOTE — PATIENT INSTRUCTIONS
Acute prostatitis seems to be resolved.  PSA on November 22nd was low normal at 0.556.  Patient will not need additional screening for prostate cancer until age 48.  I will see again for problems.

## 2023-08-28 ENCOUNTER — OFFICE VISIT (OUTPATIENT)
Dept: PRIMARY CARE CLINIC | Facility: CLINIC | Age: 43
End: 2023-08-28
Payer: COMMERCIAL

## 2023-08-28 VITALS
BODY MASS INDEX: 36.84 KG/M2 | HEART RATE: 82 BPM | RESPIRATION RATE: 20 BRPM | TEMPERATURE: 99 F | SYSTOLIC BLOOD PRESSURE: 129 MMHG | HEIGHT: 72 IN | OXYGEN SATURATION: 98 % | DIASTOLIC BLOOD PRESSURE: 78 MMHG | WEIGHT: 272 LBS

## 2023-08-28 DIAGNOSIS — H66.002 NON-RECURRENT ACUTE SUPPURATIVE OTITIS MEDIA OF LEFT EAR WITHOUT SPONTANEOUS RUPTURE OF TYMPANIC MEMBRANE: Primary | ICD-10-CM

## 2023-08-28 PROCEDURE — 1159F PR MEDICATION LIST DOCUMENTED IN MEDICAL RECORD: ICD-10-PCS | Mod: CPTII,,, | Performed by: FAMILY MEDICINE

## 2023-08-28 PROCEDURE — 1160F RVW MEDS BY RX/DR IN RCRD: CPT | Mod: CPTII,,, | Performed by: FAMILY MEDICINE

## 2023-08-28 PROCEDURE — 3078F DIAST BP <80 MM HG: CPT | Mod: CPTII,,, | Performed by: FAMILY MEDICINE

## 2023-08-28 PROCEDURE — 96372 PR INJECTION,THERAP/PROPH/DIAG2ST, IM OR SUBCUT: ICD-10-PCS | Mod: ,,, | Performed by: FAMILY MEDICINE

## 2023-08-28 PROCEDURE — 1159F MED LIST DOCD IN RCRD: CPT | Mod: CPTII,,, | Performed by: FAMILY MEDICINE

## 2023-08-28 PROCEDURE — 96372 THER/PROPH/DIAG INJ SC/IM: CPT | Mod: ,,, | Performed by: FAMILY MEDICINE

## 2023-08-28 PROCEDURE — 1160F PR REVIEW ALL MEDS BY PRESCRIBER/CLIN PHARMACIST DOCUMENTED: ICD-10-PCS | Mod: CPTII,,, | Performed by: FAMILY MEDICINE

## 2023-08-28 PROCEDURE — 99213 PR OFFICE/OUTPT VISIT, EST, LEVL III, 20-29 MIN: ICD-10-PCS | Mod: 25,,, | Performed by: FAMILY MEDICINE

## 2023-08-28 PROCEDURE — 3074F SYST BP LT 130 MM HG: CPT | Mod: CPTII,,, | Performed by: FAMILY MEDICINE

## 2023-08-28 PROCEDURE — 99213 OFFICE O/P EST LOW 20 MIN: CPT | Mod: 25,,, | Performed by: FAMILY MEDICINE

## 2023-08-28 PROCEDURE — 3008F PR BODY MASS INDEX (BMI) DOCUMENTED: ICD-10-PCS | Mod: CPTII,,, | Performed by: FAMILY MEDICINE

## 2023-08-28 PROCEDURE — 3008F BODY MASS INDEX DOCD: CPT | Mod: CPTII,,, | Performed by: FAMILY MEDICINE

## 2023-08-28 PROCEDURE — 3074F PR MOST RECENT SYSTOLIC BLOOD PRESSURE < 130 MM HG: ICD-10-PCS | Mod: CPTII,,, | Performed by: FAMILY MEDICINE

## 2023-08-28 PROCEDURE — 3078F PR MOST RECENT DIASTOLIC BLOOD PRESSURE < 80 MM HG: ICD-10-PCS | Mod: CPTII,,, | Performed by: FAMILY MEDICINE

## 2023-08-28 RX ORDER — AMOXICILLIN AND CLAVULANATE POTASSIUM 500; 125 MG/1; MG/1
1 TABLET, FILM COATED ORAL 2 TIMES DAILY
Qty: 20 TABLET | Refills: 0 | Status: SHIPPED | OUTPATIENT
Start: 2023-08-28

## 2023-08-28 RX ORDER — CEFTRIAXONE 1 G/1
1 INJECTION, POWDER, FOR SOLUTION INTRAMUSCULAR; INTRAVENOUS
Status: COMPLETED | OUTPATIENT
Start: 2023-08-28 | End: 2023-08-28

## 2023-08-28 RX ORDER — METHYLPREDNISOLONE 4 MG/1
TABLET ORAL
Qty: 21 EACH | Refills: 0 | Status: SHIPPED | OUTPATIENT
Start: 2023-08-28 | End: 2023-09-18

## 2023-08-28 RX ORDER — BETAMETHASONE SODIUM PHOSPHATE AND BETAMETHASONE ACETATE 3; 3 MG/ML; MG/ML
6 INJECTION, SUSPENSION INTRA-ARTICULAR; INTRALESIONAL; INTRAMUSCULAR; SOFT TISSUE
Status: COMPLETED | OUTPATIENT
Start: 2023-08-28 | End: 2023-08-28

## 2023-08-28 RX ADMIN — CEFTRIAXONE 1 G: 1 INJECTION, POWDER, FOR SOLUTION INTRAMUSCULAR; INTRAVENOUS at 04:08

## 2023-08-28 RX ADMIN — BETAMETHASONE SODIUM PHOSPHATE AND BETAMETHASONE ACETATE 6 MG: 3; 3 INJECTION, SUSPENSION INTRA-ARTICULAR; INTRALESIONAL; INTRAMUSCULAR; SOFT TISSUE at 04:08

## 2023-08-28 NOTE — PROGRESS NOTES
Subjective     Patient ID: Regino Will is a 43 y.o. male.    Chief Complaint: Otalgia (Left ear , possible fluid,  causing soreness of neck.)    Left ear ache    Otalgia   Pertinent negatives include no coughing, diarrhea, headaches or vomiting.     Review of Systems   Constitutional:  Negative for fatigue and fever.   HENT:  Positive for ear pain. Negative for dental problem.    Eyes:  Negative for discharge.   Respiratory:  Negative for cough, choking, chest tightness and shortness of breath.    Cardiovascular:  Negative for chest pain and leg swelling.   Gastrointestinal:  Negative for constipation, diarrhea, nausea and vomiting.   Genitourinary:  Negative for discharge and flank pain.   Musculoskeletal:  Negative for arthralgias and myalgias.   Allergic/Immunologic: Negative for environmental allergies.   Neurological:  Negative for headaches and memory loss.   Psychiatric/Behavioral:  Negative for behavioral problems and hallucinations.           Objective     Physical Exam  Vitals and nursing note reviewed.   Constitutional:       Appearance: Normal appearance. He is normal weight.   HENT:      Head: Normocephalic and atraumatic.      Right Ear: Tympanic membrane normal.      Left Ear: A middle ear effusion is present. Tympanic membrane is erythematous.      Nose: Nose normal.      Mouth/Throat:      Mouth: Mucous membranes are moist.   Eyes:      Extraocular Movements: Extraocular movements intact.      Conjunctiva/sclera: Conjunctivae normal.      Pupils: Pupils are equal, round, and reactive to light.   Cardiovascular:      Rate and Rhythm: Normal rate and regular rhythm.      Pulses: Normal pulses.   Pulmonary:      Effort: Pulmonary effort is normal.      Breath sounds: Normal breath sounds.   Abdominal:      General: Abdomen is flat. Bowel sounds are normal.      Palpations: Abdomen is soft.   Musculoskeletal:         General: Normal range of motion.      Cervical back: Normal range of motion and neck  supple.   Skin:     General: Skin is warm and dry.   Neurological:      General: No focal deficit present.      Mental Status: He is alert and oriented to person, place, and time.   Psychiatric:         Mood and Affect: Mood normal.            Assessment and Plan     1. Non-recurrent acute suppurative otitis media of left ear without spontaneous rupture of tympanic membrane  -     cefTRIAXone injection 1 g  -     betamethasone acetate-betamethasone sodium phosphate injection 6 mg  -     amoxicillin-clavulanate 500-125mg (AUGMENTIN) 500-125 mg Tab; Take 1 tablet (500 mg total) by mouth 2 (two) times daily.  Dispense: 20 tablet; Refill: 0  -     methylPREDNISolone (MEDROL DOSEPACK) 4 mg tablet; use as directed  Dispense: 21 each; Refill: 0        RTC 1 week if s/sx continue         No follow-ups on file.

## 2023-09-20 DIAGNOSIS — H66.004 RECURRENT ACUTE SUPPURATIVE OTITIS MEDIA OF RIGHT EAR WITHOUT SPONTANEOUS RUPTURE OF TYMPANIC MEMBRANE: Primary | ICD-10-CM

## 2023-09-27 ENCOUNTER — OFFICE VISIT (OUTPATIENT)
Dept: PRIMARY CARE CLINIC | Facility: CLINIC | Age: 43
End: 2023-09-27
Payer: COMMERCIAL

## 2023-09-27 VITALS
HEART RATE: 83 BPM | OXYGEN SATURATION: 98 % | DIASTOLIC BLOOD PRESSURE: 82 MMHG | SYSTOLIC BLOOD PRESSURE: 135 MMHG | WEIGHT: 279 LBS | BODY MASS INDEX: 37.79 KG/M2 | HEIGHT: 72 IN | TEMPERATURE: 98 F | RESPIRATION RATE: 18 BRPM

## 2023-09-27 DIAGNOSIS — H66.3X2 CHRONIC SUPPURATIVE OTITIS MEDIA OF LEFT EAR, UNSPECIFIED OTITIS MEDIA LOCATION: Primary | ICD-10-CM

## 2023-09-27 PROCEDURE — 3075F SYST BP GE 130 - 139MM HG: CPT | Mod: CPTII,,, | Performed by: FAMILY MEDICINE

## 2023-09-27 PROCEDURE — 96372 THER/PROPH/DIAG INJ SC/IM: CPT | Mod: ,,, | Performed by: FAMILY MEDICINE

## 2023-09-27 PROCEDURE — 99213 PR OFFICE/OUTPT VISIT, EST, LEVL III, 20-29 MIN: ICD-10-PCS | Mod: 25,,, | Performed by: FAMILY MEDICINE

## 2023-09-27 PROCEDURE — 3079F PR MOST RECENT DIASTOLIC BLOOD PRESSURE 80-89 MM HG: ICD-10-PCS | Mod: CPTII,,, | Performed by: FAMILY MEDICINE

## 2023-09-27 PROCEDURE — 3008F PR BODY MASS INDEX (BMI) DOCUMENTED: ICD-10-PCS | Mod: CPTII,,, | Performed by: FAMILY MEDICINE

## 2023-09-27 PROCEDURE — 96372 PR INJECTION,THERAP/PROPH/DIAG2ST, IM OR SUBCUT: ICD-10-PCS | Mod: ,,, | Performed by: FAMILY MEDICINE

## 2023-09-27 PROCEDURE — 3079F DIAST BP 80-89 MM HG: CPT | Mod: CPTII,,, | Performed by: FAMILY MEDICINE

## 2023-09-27 PROCEDURE — 3075F PR MOST RECENT SYSTOLIC BLOOD PRESS GE 130-139MM HG: ICD-10-PCS | Mod: CPTII,,, | Performed by: FAMILY MEDICINE

## 2023-09-27 PROCEDURE — 99213 OFFICE O/P EST LOW 20 MIN: CPT | Mod: 25,,, | Performed by: FAMILY MEDICINE

## 2023-09-27 PROCEDURE — 3008F BODY MASS INDEX DOCD: CPT | Mod: CPTII,,, | Performed by: FAMILY MEDICINE

## 2023-09-27 RX ORDER — LINCOMYCIN HYDROCHLORIDE 300 MG/ML
600 INJECTION, SOLUTION INTRAMUSCULAR; INTRAVENOUS; SUBCONJUNCTIVAL
Status: COMPLETED | OUTPATIENT
Start: 2023-09-27 | End: 2023-09-27

## 2023-09-27 RX ORDER — METHYLPREDNISOLONE 4 MG/1
TABLET ORAL
Qty: 21 EACH | Refills: 0 | Status: SHIPPED | OUTPATIENT
Start: 2023-09-27 | End: 2023-10-18

## 2023-09-27 RX ORDER — AZITHROMYCIN 250 MG/1
250 TABLET, FILM COATED ORAL DAILY
Qty: 10 TABLET | Refills: 0 | Status: SHIPPED | OUTPATIENT
Start: 2023-09-27

## 2023-09-27 RX ORDER — TRIAMCINOLONE ACETONIDE 40 MG/ML
40 INJECTION, SUSPENSION INTRA-ARTICULAR; INTRAMUSCULAR
Status: COMPLETED | OUTPATIENT
Start: 2023-09-27 | End: 2023-09-27

## 2023-09-27 RX ADMIN — LINCOMYCIN HYDROCHLORIDE 600 MG: 300 INJECTION, SOLUTION INTRAMUSCULAR; INTRAVENOUS; SUBCONJUNCTIVAL at 04:09

## 2023-09-27 RX ADMIN — TRIAMCINOLONE ACETONIDE 40 MG: 40 INJECTION, SUSPENSION INTRA-ARTICULAR; INTRAMUSCULAR at 04:09

## 2023-09-27 NOTE — PROGRESS NOTES
Subjective     Patient ID: Regino Will is a 43 y.o. male.    Chief Complaint: Otalgia (Both ears but his left hurts more)    Now with issues with both ears. To see Dr. Alex on the 11th.    Otalgia   Pertinent negatives include no coughing, diarrhea, headaches or vomiting.     Review of Systems   Constitutional:  Negative for fatigue and fever.   HENT:  Positive for ear pain. Negative for dental problem.    Eyes:  Negative for discharge.   Respiratory:  Negative for cough, choking, chest tightness and shortness of breath.    Cardiovascular:  Negative for chest pain and leg swelling.   Gastrointestinal:  Negative for constipation, diarrhea, nausea and vomiting.   Genitourinary:  Negative for discharge and flank pain.   Musculoskeletal:  Negative for arthralgias and myalgias.   Allergic/Immunologic: Negative for environmental allergies.   Neurological:  Negative for headaches and memory loss.   Psychiatric/Behavioral:  Negative for behavioral problems and hallucinations.           Objective     Physical Exam  Vitals and nursing note reviewed.   Constitutional:       Appearance: Normal appearance. He is normal weight.   HENT:      Head: Normocephalic and atraumatic.      Right Ear: A middle ear effusion is present.      Left Ear: A middle ear effusion is present. Tympanic membrane is erythematous.      Nose: Nose normal.      Mouth/Throat:      Mouth: Mucous membranes are moist.   Eyes:      Extraocular Movements: Extraocular movements intact.      Conjunctiva/sclera: Conjunctivae normal.      Pupils: Pupils are equal, round, and reactive to light.   Cardiovascular:      Rate and Rhythm: Normal rate and regular rhythm.      Pulses: Normal pulses.   Pulmonary:      Effort: Pulmonary effort is normal.      Breath sounds: Normal breath sounds.   Abdominal:      General: Abdomen is flat. Bowel sounds are normal.      Palpations: Abdomen is soft.   Musculoskeletal:         General: Normal range of motion.      Cervical  back: Normal range of motion and neck supple.   Skin:     General: Skin is warm and dry.   Neurological:      General: No focal deficit present.      Mental Status: He is alert and oriented to person, place, and time.   Psychiatric:         Mood and Affect: Mood normal.            Assessment and Plan     1. Chronic suppurative otitis media of left ear, unspecified otitis media location  -     lincomycin injection 600 mg  -     triamcinolone acetonide injection 40 mg  -     azithromycin (Z-JANUARY) 250 MG tablet; Take 1 tablet (250 mg total) by mouth once daily.  Dispense: 10 tablet; Refill: 0  -     methylPREDNISolone (MEDROL DOSEPACK) 4 mg tablet; use as directed  Dispense: 21 each; Refill: 0        RTC if s/sx continue         No follow-ups on file.

## 2025-03-24 ENCOUNTER — OFFICE VISIT (OUTPATIENT)
Dept: PRIMARY CARE CLINIC | Facility: CLINIC | Age: 45
End: 2025-03-24
Payer: COMMERCIAL

## 2025-03-24 VITALS
SYSTOLIC BLOOD PRESSURE: 132 MMHG | WEIGHT: 295 LBS | HEIGHT: 72 IN | BODY MASS INDEX: 39.96 KG/M2 | HEART RATE: 84 BPM | OXYGEN SATURATION: 98 % | DIASTOLIC BLOOD PRESSURE: 78 MMHG | TEMPERATURE: 98 F | RESPIRATION RATE: 20 BRPM

## 2025-03-24 DIAGNOSIS — J40 BRONCHITIS: ICD-10-CM

## 2025-03-24 DIAGNOSIS — R05.9 COUGH, UNSPECIFIED TYPE: Primary | ICD-10-CM

## 2025-03-24 DIAGNOSIS — E66.9 OBESITY, UNSPECIFIED CLASS, UNSPECIFIED OBESITY TYPE, UNSPECIFIED WHETHER SERIOUS COMORBIDITY PRESENT: ICD-10-CM

## 2025-03-24 PROCEDURE — 3078F DIAST BP <80 MM HG: CPT | Mod: CPTII,,, | Performed by: FAMILY MEDICINE

## 2025-03-24 PROCEDURE — 3075F SYST BP GE 130 - 139MM HG: CPT | Mod: CPTII,,, | Performed by: FAMILY MEDICINE

## 2025-03-24 PROCEDURE — 99213 OFFICE O/P EST LOW 20 MIN: CPT | Mod: 25,,, | Performed by: FAMILY MEDICINE

## 2025-03-24 PROCEDURE — 3008F BODY MASS INDEX DOCD: CPT | Mod: CPTII,,, | Performed by: FAMILY MEDICINE

## 2025-03-24 PROCEDURE — 1160F RVW MEDS BY RX/DR IN RCRD: CPT | Mod: CPTII,,, | Performed by: FAMILY MEDICINE

## 2025-03-24 PROCEDURE — 96372 THER/PROPH/DIAG INJ SC/IM: CPT | Mod: ,,, | Performed by: FAMILY MEDICINE

## 2025-03-24 PROCEDURE — 1159F MED LIST DOCD IN RCRD: CPT | Mod: CPTII,,, | Performed by: FAMILY MEDICINE

## 2025-03-24 RX ORDER — BENZONATATE 100 MG/1
100 CAPSULE ORAL
COMMUNITY
Start: 2025-03-13

## 2025-03-24 RX ORDER — METHYLPREDNISOLONE 4 MG/1
TABLET ORAL
Qty: 21 EACH | Refills: 0 | Status: SHIPPED | OUTPATIENT
Start: 2025-03-24 | End: 2025-04-14

## 2025-03-24 RX ORDER — HYDROCODONE BITARTRATE AND HOMATROPINE METHYLBROMIDE ORAL SOLUTION 5; 1.5 MG/5ML; MG/5ML
5 LIQUID ORAL EVERY 6 HOURS PRN
COMMUNITY
Start: 2025-03-13

## 2025-03-24 RX ORDER — CLARITHROMYCIN 500 MG/1
500 TABLET, FILM COATED ORAL EVERY 12 HOURS
Qty: 20 TABLET | Refills: 0 | Status: SHIPPED | OUTPATIENT
Start: 2025-03-24

## 2025-03-24 RX ORDER — BETAMETHASONE SODIUM PHOSPHATE AND BETAMETHASONE ACETATE 3; 3 MG/ML; MG/ML
6 INJECTION, SUSPENSION INTRA-ARTICULAR; INTRALESIONAL; INTRAMUSCULAR; SOFT TISSUE ONCE
Status: COMPLETED | OUTPATIENT
Start: 2025-03-24 | End: 2025-03-24

## 2025-03-24 RX ORDER — LINCOMYCIN HYDROCHLORIDE 300 MG/ML
600 INJECTION, SOLUTION INTRAMUSCULAR; INTRAVENOUS; SUBCONJUNCTIVAL
Status: COMPLETED | OUTPATIENT
Start: 2025-03-24 | End: 2025-03-24

## 2025-03-24 RX ORDER — DEXCHLORPHENIRAMINE MALEATE, DEXTROMETHORPHAN HBR, PHENYLEPHRINE HCL 1; 10; 5 MG/5ML; MG/5ML; MG/5ML
10 SYRUP ORAL
Qty: 240 ML | Refills: 1 | Status: SHIPPED | OUTPATIENT
Start: 2025-03-24

## 2025-03-24 RX ORDER — PHENTERMINE HYDROCHLORIDE 37.5 MG/1
37.5 TABLET ORAL
Qty: 30 TABLET | Refills: 0 | Status: CANCELLED | OUTPATIENT
Start: 2025-03-24

## 2025-03-24 RX ORDER — PHENTERMINE HYDROCHLORIDE 37.5 MG/1
37.5 TABLET ORAL
Qty: 30 TABLET | Refills: 0 | Status: SHIPPED | OUTPATIENT
Start: 2025-03-24

## 2025-03-24 RX ADMIN — LINCOMYCIN HYDROCHLORIDE 600 MG: 300 INJECTION, SOLUTION INTRAMUSCULAR; INTRAVENOUS; SUBCONJUNCTIVAL at 09:03

## 2025-03-24 RX ADMIN — BETAMETHASONE SODIUM PHOSPHATE AND BETAMETHASONE ACETATE 6 MG: 3; 3 INJECTION, SUSPENSION INTRA-ARTICULAR; INTRALESIONAL; INTRAMUSCULAR; SOFT TISSUE at 09:03

## 2025-03-24 NOTE — PROGRESS NOTES
Answers submitted by the patient for this visit:  Cough Questionnaire (Submitted on 3/23/2025)  Chief Complaint: Cough  Chronicity: new  Onset: 1 to 4 weeks ago  Progression since onset: gradually worsening  Frequency: constantly  Cough characteristics: productive of brown sputum  chest pain: No  chills: No  ear congestion: No  ear pain: No  fever: No  headaches: Yes  heartburn: No  hemoptysis: No  myalgias: Yes  nasal congestion: Yes  postnasal drip: Yes  rash: No  rhinorrhea: Yes  shortness of breath: Yes  sore throat: No  sweats: No  weight loss: No  wheezing: Yes  Aggravated by: lying down, pollens  asthma: No  bronchiectasis: No  bronchitis: Yes  COPD: No  emphysema: No  environmental allergies: No  pneumonia: No  Treatments tried: OTC cough suppressant, prescription cough suppressant  Improvement on treatment: no relief

## 2025-03-24 NOTE — PROGRESS NOTES
Subjective     Patient ID: Regino Will is a 44 y.o. male.    Chief Complaint: Cough (Going on for 3 weeks, starting to wheeze )    Productive cough    Cough  This is a new problem. The current episode started 1 to 4 weeks ago. The problem has been gradually worsening. The problem occurs constantly. The cough is Productive of brown sputum. Associated symptoms include headaches, myalgias, nasal congestion, postnasal drip, rhinorrhea, shortness of breath and wheezing. Pertinent negatives include no chest pain, chills, ear congestion, ear pain, fever, heartburn, hemoptysis, rash, sore throat, sweats or weight loss. The symptoms are aggravated by lying down and pollens. He has tried OTC cough suppressant and prescription cough suppressant for the symptoms. The treatment provided no relief. His past medical history is significant for bronchitis. There is no history of asthma, bronchiectasis, COPD, emphysema, environmental allergies or pneumonia.     Review of Systems   Constitutional:  Negative for chills, fatigue, fever and weight loss.   HENT:  Positive for postnasal drip and rhinorrhea. Negative for nasal congestion, dental problem, ear pain and sore throat.    Eyes:  Negative for discharge.   Respiratory:  Positive for cough, shortness of breath and wheezing. Negative for hemoptysis.    Cardiovascular:  Negative for chest pain.   Gastrointestinal:  Negative for constipation, diarrhea, heartburn, nausea and vomiting.   Genitourinary:  Negative for bladder incontinence and difficulty urinating.   Musculoskeletal:  Positive for myalgias.   Integumentary:  Negative for rash.   Allergic/Immunologic: Negative for environmental allergies.   Neurological:  Positive for headaches.   Psychiatric/Behavioral:  Negative for behavioral problems and confusion.           Objective     Physical Exam  Vitals and nursing note reviewed.   Constitutional:       Appearance: Normal appearance. He is normal weight.   HENT:      Head:  Normocephalic and atraumatic.      Right Ear: Tympanic membrane normal.      Left Ear: Tympanic membrane normal.      Nose: Nose normal.      Mouth/Throat:      Mouth: Mucous membranes are moist.   Eyes:      Extraocular Movements: Extraocular movements intact.      Conjunctiva/sclera: Conjunctivae normal.      Pupils: Pupils are equal, round, and reactive to light.   Cardiovascular:      Rate and Rhythm: Normal rate and regular rhythm.      Pulses: Normal pulses.   Pulmonary:      Effort: Pulmonary effort is normal.      Breath sounds: Normal breath sounds.      Comments: Clear with cough  Abdominal:      General: Abdomen is flat. Bowel sounds are normal.      Palpations: Abdomen is soft.   Musculoskeletal:         General: Normal range of motion.      Cervical back: Normal range of motion and neck supple.   Skin:     General: Skin is warm and dry.   Neurological:      General: No focal deficit present.      Mental Status: He is alert and oriented to person, place, and time.   Psychiatric:         Mood and Affect: Mood normal.            Assessment and Plan     1. Cough, unspecified type  -     X-Ray Chest PA And Lateral; Future; Expected date: 03/24/2025    2. Obesity, unspecified class, unspecified obesity type, unspecified whether serious comorbidity present  -     phentermine (ADIPEX-P) 37.5 mg tablet; Take 1 tablet (37.5 mg total) by mouth before breakfast.  Dispense: 30 tablet; Refill: 0    3. Bronchitis  -     lincomycin injection 600 mg  -     betamethasone acetate-betamethasone sodium phosphate injection 6 mg  -     clarithromycin (BIAXIN) 500 MG tablet; Take 1 tablet (500 mg total) by mouth every 12 (twelve) hours.  Dispense: 20 tablet; Refill: 0  -     methylPREDNISolone (MEDROL DOSEPACK) 4 mg tablet; use as directed  Dispense: 21 each; Refill: 0  -     dexchlorphen-phenylephrine-DM (POLYTUSSIN DM,DEXCHLORPHENRMN,) 1-5-10 mg/5 mL Syrp; Take 10 mLs by mouth every 6 (six) hours while awake.  Dispense: 240  mL; Refill: 1        RTC if s/sx continue         No follow-ups on file.

## 2025-03-24 NOTE — LETTER
March 24, 2025      [facility]  [address] [place] [address]  Phone: [phone]  Fax: [phone]       Patient: Regino Will   YOB: 1980  Date of Visit: 03/24/2025    To Whom It May Concern:    Anita Will  was at [facility] on 03/24/2025. The patient may return to work/school on 03/25/2025 with no restrictions. If you have any questions or concerns, or if I can be of further assistance, please do not hesitate to contact me.    Sincerely,    Mony Palencia LPN